# Patient Record
Sex: MALE | Race: WHITE | NOT HISPANIC OR LATINO | Employment: FULL TIME | URBAN - METROPOLITAN AREA
[De-identification: names, ages, dates, MRNs, and addresses within clinical notes are randomized per-mention and may not be internally consistent; named-entity substitution may affect disease eponyms.]

---

## 2017-04-04 ENCOUNTER — HISTORICAL (OUTPATIENT)
Dept: LAB | Facility: HOSPITAL | Age: 62
End: 2017-04-04

## 2017-05-11 ENCOUNTER — HISTORICAL (OUTPATIENT)
Dept: LAB | Facility: HOSPITAL | Age: 62
End: 2017-05-11

## 2017-10-03 ENCOUNTER — HISTORICAL (OUTPATIENT)
Dept: LAB | Facility: HOSPITAL | Age: 62
End: 2017-10-03

## 2017-10-03 LAB
ALBUMIN SERPL-MCNC: 4.1 GM/DL (ref 3.4–5)
ALBUMIN/GLOB SERPL: 1.1 RATIO (ref 1.1–2)
ALP SERPL-CCNC: 78 UNIT/L (ref 46–116)
ALT SERPL-CCNC: 33 UNIT/L (ref 12–78)
AST SERPL-CCNC: 25 UNIT/L (ref 15–37)
BILIRUB SERPL-MCNC: 0.6 MG/DL (ref 0.2–1)
BILIRUBIN DIRECT+TOT PNL SERPL-MCNC: 0.17 MG/DL (ref 0–0.2)
BILIRUBIN DIRECT+TOT PNL SERPL-MCNC: 0.43 MG/DL (ref 0–0.8)
BUN SERPL-MCNC: 19.4 MG/DL (ref 7–18)
CALCIUM SERPL-MCNC: 10.2 MG/DL (ref 8.5–10.1)
CHLORIDE SERPL-SCNC: 104 MMOL/L (ref 98–107)
CO2 SERPL-SCNC: 29.3 MMOL/L (ref 21–32)
CREAT SERPL-MCNC: 1.5 MG/DL (ref 0.6–1.3)
EST. AVERAGE GLUCOSE BLD GHB EST-MCNC: 123 MG/DL
GLOBULIN SER-MCNC: 3.7 GM/DL (ref 2.4–3.5)
GLUCOSE SERPL-MCNC: 134 MG/DL (ref 74–106)
HBA1C MFR BLD: 5.9 % (ref 4.5–6.2)
POTASSIUM SERPL-SCNC: 5.5 MMOL/L (ref 3.5–5.1)
PROT SERPL-MCNC: 7.8 GM/DL (ref 6.4–8.2)
SODIUM SERPL-SCNC: 139 MMOL/L (ref 136–145)

## 2018-04-03 ENCOUNTER — HISTORICAL (OUTPATIENT)
Dept: LAB | Facility: HOSPITAL | Age: 63
End: 2018-04-03

## 2018-10-31 ENCOUNTER — HISTORICAL (OUTPATIENT)
Dept: LAB | Facility: HOSPITAL | Age: 63
End: 2018-10-31

## 2018-12-11 ENCOUNTER — HISTORICAL (OUTPATIENT)
Dept: LAB | Facility: HOSPITAL | Age: 63
End: 2018-12-11

## 2019-04-30 ENCOUNTER — HISTORICAL (OUTPATIENT)
Dept: LAB | Facility: HOSPITAL | Age: 64
End: 2019-04-30

## 2019-06-10 LAB — NONINV COLON CA DNA+OCC BLD SCRN STL QL: NEGATIVE

## 2019-08-15 ENCOUNTER — HISTORICAL (OUTPATIENT)
Dept: LAB | Facility: HOSPITAL | Age: 64
End: 2019-08-15

## 2021-09-16 ENCOUNTER — HISTORICAL (OUTPATIENT)
Dept: INFUSION THERAPY | Facility: HOSPITAL | Age: 66
End: 2021-09-16

## 2021-09-16 LAB
ABS NEUT (OLG): 3.29 X10(3)/MCL (ref 2.1–9.2)
ALBUMIN SERPL-MCNC: 4.4 GM/DL (ref 3.4–4.8)
ALBUMIN/GLOB SERPL: 1.4 RATIO (ref 1.1–2)
ALP SERPL-CCNC: 64 UNIT/L (ref 40–150)
ALT SERPL-CCNC: 18 UNIT/L (ref 0–55)
AST SERPL-CCNC: 25 UNIT/L (ref 5–34)
BASOPHILS # BLD AUTO: 0 X10(3)/MCL (ref 0–0.2)
BASOPHILS NFR BLD AUTO: 1 %
BILIRUB SERPL-MCNC: 1.2 MG/DL
BILIRUBIN DIRECT+TOT PNL SERPL-MCNC: 0.4 MG/DL (ref 0–0.5)
BILIRUBIN DIRECT+TOT PNL SERPL-MCNC: 0.8 MG/DL (ref 0–0.8)
BUN SERPL-MCNC: 24.1 MG/DL (ref 8.4–25.7)
CALCIUM SERPL-MCNC: 10 MG/DL (ref 8.8–10)
CHLORIDE SERPL-SCNC: 103 MMOL/L (ref 98–107)
CHOLEST SERPL-MCNC: 196 MG/DL
CHOLEST/HDLC SERPL: 4 {RATIO} (ref 0–5)
CO2 SERPL-SCNC: 22 MMOL/L (ref 23–31)
CREAT SERPL-MCNC: 1.06 MG/DL (ref 0.73–1.18)
DEPRECATED CALCIDIOL+CALCIFEROL SERPL-MC: 31.3 NG/ML (ref 30–80)
EOSINOPHIL # BLD AUTO: 0.3 X10(3)/MCL (ref 0–0.9)
EOSINOPHIL NFR BLD AUTO: 5 %
ERYTHROCYTE [DISTWIDTH] IN BLOOD BY AUTOMATED COUNT: 12.9 % (ref 11.5–17)
EST. AVERAGE GLUCOSE BLD GHB EST-MCNC: 116.9 MG/DL
GLOBULIN SER-MCNC: 3.1 GM/DL (ref 2.4–3.5)
GLUCOSE SERPL-MCNC: 100 MG/DL (ref 82–115)
HBA1C MFR BLD: 5.7 %
HCT VFR BLD AUTO: 42.6 % (ref 42–52)
HDLC SERPL-MCNC: 54 MG/DL (ref 35–60)
HGB BLD-MCNC: 14.4 GM/DL (ref 14–18)
IMM GRANULOCYTES # BLD AUTO: 0.01 % (ref 0–0.02)
IMM GRANULOCYTES NFR BLD AUTO: 0.2 % (ref 0–0.43)
LDLC SERPL CALC-MCNC: 128 MG/DL (ref 50–140)
LYMPHOCYTES # BLD AUTO: 0.9 X10(3)/MCL (ref 0.6–4.6)
LYMPHOCYTES NFR BLD AUTO: 18 %
MCH RBC QN AUTO: 29.6 PG (ref 27–31)
MCHC RBC AUTO-ENTMCNC: 33.8 GM/DL (ref 33–36)
MCV RBC AUTO: 87.7 FL (ref 80–94)
MONOCYTES # BLD AUTO: 0.4 X10(3)/MCL (ref 0.1–1.3)
MONOCYTES NFR BLD AUTO: 9 %
NEUTROPHILS # BLD AUTO: 3.29 X10(3)/MCL (ref 1.4–7.9)
NEUTROPHILS NFR BLD AUTO: 67 %
PLATELET # BLD AUTO: 202 X10(3)/MCL (ref 130–400)
PMV BLD AUTO: 9.3 FL (ref 9.4–12.4)
POTASSIUM SERPL-SCNC: 3.8 MMOL/L (ref 3.5–5.1)
PROT SERPL-MCNC: 7.5 GM/DL (ref 5.8–7.6)
PSA SERPL-MCNC: 1.31 NG/ML
RBC # BLD AUTO: 4.86 X10(6)/MCL (ref 4.7–6.1)
SODIUM SERPL-SCNC: 136 MMOL/L (ref 136–145)
TRIGL SERPL-MCNC: 68 MG/DL (ref 34–140)
TSH SERPL-ACNC: 3.51 UIU/ML (ref 0.35–4.94)
VLDLC SERPL CALC-MCNC: 14 MG/DL
WBC # SPEC AUTO: 4.9 X10(3)/MCL (ref 4.5–11.5)

## 2022-04-09 ENCOUNTER — HISTORICAL (OUTPATIENT)
Dept: ADMINISTRATIVE | Facility: HOSPITAL | Age: 67
End: 2022-04-09
Payer: COMMERCIAL

## 2022-04-25 VITALS
DIASTOLIC BLOOD PRESSURE: 81 MMHG | WEIGHT: 188.25 LBS | OXYGEN SATURATION: 99 % | BODY MASS INDEX: 26.35 KG/M2 | SYSTOLIC BLOOD PRESSURE: 134 MMHG | HEIGHT: 71 IN

## 2022-09-28 PROBLEM — N20.0 CALCULUS OF KIDNEY: Status: ACTIVE | Noted: 2022-09-28

## 2022-09-28 PROBLEM — R73.02 IMPAIRED GLUCOSE TOLERANCE: Status: ACTIVE | Noted: 2022-09-28

## 2022-09-29 PROBLEM — Z87.442 HISTORY OF NEPHROLITHIASIS: Status: ACTIVE | Noted: 2022-09-29

## 2022-09-29 PROBLEM — Z97.4 PRESENCE OF EXTERNAL HEARING AID: Status: ACTIVE | Noted: 2022-09-29

## 2022-09-29 PROBLEM — R73.01 IMPAIRED FASTING BLOOD SUGAR: Status: ACTIVE | Noted: 2022-09-29

## 2022-09-29 PROBLEM — F51.01 PRIMARY INSOMNIA: Status: ACTIVE | Noted: 2022-09-29

## 2022-09-30 PROCEDURE — 86803 HEPATITIS C AB TEST: CPT | Performed by: FAMILY MEDICINE

## 2022-10-12 ENCOUNTER — DOCUMENTATION ONLY (OUTPATIENT)
Dept: ADMINISTRATIVE | Facility: HOSPITAL | Age: 67
End: 2022-10-12
Payer: COMMERCIAL

## 2022-10-20 DIAGNOSIS — N20.0 URIC ACID NEPHROLITHIASIS: ICD-10-CM

## 2022-10-20 DIAGNOSIS — D41.02 NEOPLASM OF UNCERTAIN BEHAVIOR OF LEFT KIDNEY: Primary | ICD-10-CM

## 2022-10-20 DIAGNOSIS — R31.0 GROSS HEMATURIA: ICD-10-CM

## 2022-10-25 ENCOUNTER — HOSPITAL ENCOUNTER (OUTPATIENT)
Dept: RADIOLOGY | Facility: HOSPITAL | Age: 67
Discharge: HOME | End: 2022-10-25
Attending: UROLOGY
Payer: COMMERCIAL

## 2022-10-25 DIAGNOSIS — D41.02 NEOPLASM OF UNCERTAIN BEHAVIOR OF LEFT KIDNEY: ICD-10-CM

## 2022-10-25 PROCEDURE — 25500020 PHARM REV CODE 255: Performed by: UROLOGY

## 2022-10-25 PROCEDURE — 74178 CT ABD&PLV WO CNTR FLWD CNTR: CPT | Mod: TC

## 2022-10-25 RX ADMIN — IOPAMIDOL 100 ML: 755 INJECTION, SOLUTION INTRAVENOUS at 08:10

## 2022-12-08 PROBLEM — C64.2 CLEAR CELL RENAL CELL CARCINOMA, LEFT: Status: ACTIVE | Noted: 2022-12-08

## 2022-12-08 PROBLEM — E11.9 TYPE 2 DIABETES MELLITUS WITHOUT COMPLICATION, WITHOUT LONG-TERM CURRENT USE OF INSULIN: Status: ACTIVE | Noted: 2022-12-08

## 2022-12-08 PROBLEM — H04.301 DACROCYSTITIS, RIGHT: Status: ACTIVE | Noted: 2022-12-08

## 2022-12-08 PROCEDURE — 85730 THROMBOPLASTIN TIME PARTIAL: CPT | Performed by: NURSE PRACTITIONER

## 2022-12-08 PROCEDURE — 85610 PROTHROMBIN TIME: CPT | Performed by: NURSE PRACTITIONER

## 2022-12-15 ENCOUNTER — ANESTHESIA (OUTPATIENT)
Dept: SURGERY | Facility: HOSPITAL | Age: 67
End: 2022-12-15
Payer: COMMERCIAL

## 2022-12-15 ENCOUNTER — ANESTHESIA EVENT (OUTPATIENT)
Dept: SURGERY | Facility: HOSPITAL | Age: 67
End: 2022-12-15
Payer: COMMERCIAL

## 2022-12-15 ENCOUNTER — HOSPITAL ENCOUNTER (OUTPATIENT)
Facility: HOSPITAL | Age: 67
Discharge: HOME | End: 2022-12-15
Attending: OTOLARYNGOLOGY | Admitting: OTOLARYNGOLOGY
Payer: COMMERCIAL

## 2022-12-15 DIAGNOSIS — H04.329: ICD-10-CM

## 2022-12-15 LAB
POCT GLUCOSE: 134 MG/DL (ref 70–110)
POCT GLUCOSE: 150 MG/DL (ref 70–110)

## 2022-12-15 PROCEDURE — 82962 GLUCOSE BLOOD TEST: CPT | Performed by: OTOLARYNGOLOGY

## 2022-12-15 PROCEDURE — 36000709 HC OR TIME LEV III EA ADD 15 MIN: Performed by: OTOLARYNGOLOGY

## 2022-12-15 PROCEDURE — 25000003 PHARM REV CODE 250

## 2022-12-15 PROCEDURE — 71000015 HC POSTOP RECOV 1ST HR: Performed by: OTOLARYNGOLOGY

## 2022-12-15 PROCEDURE — C9046 COCAINE HCL NASAL SOLUTION: HCPCS | Mod: TB | Performed by: OTOLARYNGOLOGY

## 2022-12-15 PROCEDURE — 71000016 HC POSTOP RECOV ADDL HR: Performed by: OTOLARYNGOLOGY

## 2022-12-15 PROCEDURE — 63600175 PHARM REV CODE 636 W HCPCS: Performed by: OTOLARYNGOLOGY

## 2022-12-15 PROCEDURE — 63600175 PHARM REV CODE 636 W HCPCS

## 2022-12-15 PROCEDURE — 37000009 HC ANESTHESIA EA ADD 15 MINS: Performed by: OTOLARYNGOLOGY

## 2022-12-15 PROCEDURE — 36000708 HC OR TIME LEV III 1ST 15 MIN: Performed by: OTOLARYNGOLOGY

## 2022-12-15 PROCEDURE — 27201423 OPTIME MED/SURG SUP & DEVICES STERILE SUPPLY: Performed by: OTOLARYNGOLOGY

## 2022-12-15 PROCEDURE — 71000033 HC RECOVERY, INTIAL HOUR: Performed by: OTOLARYNGOLOGY

## 2022-12-15 PROCEDURE — P9047 ALBUMIN (HUMAN), 25%, 50ML: HCPCS | Mod: JG

## 2022-12-15 PROCEDURE — 25000003 PHARM REV CODE 250: Performed by: OTOLARYNGOLOGY

## 2022-12-15 PROCEDURE — 63600175 PHARM REV CODE 636 W HCPCS: Mod: JG

## 2022-12-15 PROCEDURE — 63600175 PHARM REV CODE 636 W HCPCS: Performed by: ANESTHESIOLOGY

## 2022-12-15 PROCEDURE — 37000008 HC ANESTHESIA 1ST 15 MINUTES: Performed by: OTOLARYNGOLOGY

## 2022-12-15 PROCEDURE — 25000003 PHARM REV CODE 250: Performed by: ANESTHESIOLOGY

## 2022-12-15 RX ORDER — SILVER NITRATE 38.21; 12.74 MG/1; MG/1
STICK TOPICAL
Status: DISCONTINUED | OUTPATIENT
Start: 2022-12-15 | End: 2022-12-15 | Stop reason: HOSPADM

## 2022-12-15 RX ORDER — ALBUMIN HUMAN 250 G/1000ML
SOLUTION INTRAVENOUS CONTINUOUS PRN
Status: DISCONTINUED | OUTPATIENT
Start: 2022-12-15 | End: 2022-12-15

## 2022-12-15 RX ORDER — MIDAZOLAM HYDROCHLORIDE 1 MG/ML
2 INJECTION INTRAMUSCULAR; INTRAVENOUS ONCE AS NEEDED
Status: COMPLETED | OUTPATIENT
Start: 2022-12-15 | End: 2022-12-15

## 2022-12-15 RX ORDER — COCAINE HYDROCHLORIDE 40 MG/ML
SOLUTION NASAL
Status: DISCONTINUED
Start: 2022-12-15 | End: 2022-12-15 | Stop reason: HOSPADM

## 2022-12-15 RX ORDER — GLYCOPYRROLATE 0.2 MG/ML
INJECTION INTRAMUSCULAR; INTRAVENOUS
Status: DISCONTINUED | OUTPATIENT
Start: 2022-12-15 | End: 2022-12-15

## 2022-12-15 RX ORDER — ONDANSETRON HYDROCHLORIDE 2 MG/ML
INJECTION, SOLUTION INTRAMUSCULAR; INTRAVENOUS
Status: DISCONTINUED | OUTPATIENT
Start: 2022-12-15 | End: 2022-12-15

## 2022-12-15 RX ORDER — ONDANSETRON 2 MG/ML
4 INJECTION INTRAMUSCULAR; INTRAVENOUS DAILY PRN
Status: DISCONTINUED | OUTPATIENT
Start: 2022-12-15 | End: 2022-12-15 | Stop reason: HOSPADM

## 2022-12-15 RX ORDER — METOCLOPRAMIDE HYDROCHLORIDE 5 MG/ML
10 INJECTION INTRAMUSCULAR; INTRAVENOUS EVERY 10 MIN PRN
Status: DISCONTINUED | OUTPATIENT
Start: 2022-12-15 | End: 2022-12-15 | Stop reason: HOSPADM

## 2022-12-15 RX ORDER — PROPOFOL 10 MG/ML
VIAL (ML) INTRAVENOUS
Status: DISCONTINUED | OUTPATIENT
Start: 2022-12-15 | End: 2022-12-15

## 2022-12-15 RX ORDER — ACETAMINOPHEN 500 MG
1000 TABLET ORAL
Status: COMPLETED | OUTPATIENT
Start: 2022-12-15 | End: 2022-12-15

## 2022-12-15 RX ORDER — ONDANSETRON 4 MG/1
4 TABLET, ORALLY DISINTEGRATING ORAL ONCE
Status: COMPLETED | OUTPATIENT
Start: 2022-12-15 | End: 2022-12-15

## 2022-12-15 RX ORDER — CEFAZOLIN SODIUM 1 G/3ML
2 INJECTION, POWDER, FOR SOLUTION INTRAMUSCULAR; INTRAVENOUS
Status: COMPLETED | OUTPATIENT
Start: 2022-12-15 | End: 2022-12-15

## 2022-12-15 RX ORDER — ONDANSETRON 2 MG/ML
4 INJECTION INTRAMUSCULAR; INTRAVENOUS EVERY 4 HOURS PRN
Status: DISCONTINUED | OUTPATIENT
Start: 2022-12-15 | End: 2022-12-15 | Stop reason: HOSPADM

## 2022-12-15 RX ORDER — FENTANYL CITRATE 50 UG/ML
INJECTION, SOLUTION INTRAMUSCULAR; INTRAVENOUS
Status: DISCONTINUED | OUTPATIENT
Start: 2022-12-15 | End: 2022-12-15

## 2022-12-15 RX ORDER — COCAINE HYDROCHLORIDE 40 MG/ML
SOLUTION NASAL
Status: DISCONTINUED | OUTPATIENT
Start: 2022-12-15 | End: 2022-12-15 | Stop reason: HOSPADM

## 2022-12-15 RX ORDER — SILVER NITRATE 38.21; 12.74 MG/1; MG/1
STICK TOPICAL
Status: DISCONTINUED
Start: 2022-12-15 | End: 2022-12-15 | Stop reason: HOSPADM

## 2022-12-15 RX ORDER — ACETAMINOPHEN 500 MG
TABLET ORAL
Status: DISCONTINUED
Start: 2022-12-15 | End: 2022-12-15 | Stop reason: HOSPADM

## 2022-12-15 RX ORDER — LIDOCAINE HYDROCHLORIDE 10 MG/ML
1 INJECTION, SOLUTION EPIDURAL; INFILTRATION; INTRACAUDAL; PERINEURAL ONCE
Status: CANCELLED | OUTPATIENT
Start: 2022-12-15 | End: 2022-12-15

## 2022-12-15 RX ORDER — SODIUM CHLORIDE, SODIUM LACTATE, POTASSIUM CHLORIDE, CALCIUM CHLORIDE 600; 310; 30; 20 MG/100ML; MG/100ML; MG/100ML; MG/100ML
INJECTION, SOLUTION INTRAVENOUS CONTINUOUS
Status: DISCONTINUED | OUTPATIENT
Start: 2022-12-15 | End: 2022-12-15 | Stop reason: HOSPADM

## 2022-12-15 RX ORDER — DEXMEDETOMIDINE HYDROCHLORIDE 100 UG/ML
INJECTION, SOLUTION INTRAVENOUS
Status: DISCONTINUED | OUTPATIENT
Start: 2022-12-15 | End: 2022-12-15

## 2022-12-15 RX ORDER — HYDROMORPHONE HYDROCHLORIDE 2 MG/ML
0.2 INJECTION, SOLUTION INTRAMUSCULAR; INTRAVENOUS; SUBCUTANEOUS EVERY 5 MIN PRN
Status: DISCONTINUED | OUTPATIENT
Start: 2022-12-15 | End: 2022-12-15 | Stop reason: HOSPADM

## 2022-12-15 RX ORDER — OXYMETAZOLINE HCL 0.05 %
SPRAY, NON-AEROSOL (ML) NASAL
Status: DISCONTINUED | OUTPATIENT
Start: 2022-12-15 | End: 2022-12-15 | Stop reason: HOSPADM

## 2022-12-15 RX ORDER — ERYTHROMYCIN 5 MG/G
OINTMENT OPHTHALMIC
Status: DISCONTINUED
Start: 2022-12-15 | End: 2022-12-15 | Stop reason: HOSPADM

## 2022-12-15 RX ORDER — ERYTHROMYCIN 5 MG/G
OINTMENT OPHTHALMIC
Status: DISCONTINUED | OUTPATIENT
Start: 2022-12-15 | End: 2022-12-15 | Stop reason: HOSPADM

## 2022-12-15 RX ORDER — GABAPENTIN 300 MG/1
CAPSULE ORAL
Status: DISCONTINUED
Start: 2022-12-15 | End: 2022-12-15 | Stop reason: HOSPADM

## 2022-12-15 RX ORDER — BUPIVACAINE HYDROCHLORIDE AND EPINEPHRINE 5; 5 MG/ML; UG/ML
INJECTION, SOLUTION EPIDURAL; INTRACAUDAL; PERINEURAL
Status: DISCONTINUED
Start: 2022-12-15 | End: 2022-12-15 | Stop reason: HOSPADM

## 2022-12-15 RX ORDER — LIDOCAINE HYDROCHLORIDE 10 MG/ML
INJECTION, SOLUTION EPIDURAL; INFILTRATION; INTRACAUDAL; PERINEURAL
Status: DISCONTINUED | OUTPATIENT
Start: 2022-12-15 | End: 2022-12-15

## 2022-12-15 RX ORDER — SODIUM CHLORIDE, SODIUM GLUCONATE, SODIUM ACETATE, POTASSIUM CHLORIDE AND MAGNESIUM CHLORIDE 30; 37; 368; 526; 502 MG/100ML; MG/100ML; MG/100ML; MG/100ML; MG/100ML
INJECTION, SOLUTION INTRAVENOUS CONTINUOUS
Status: CANCELLED | OUTPATIENT
Start: 2022-12-15 | End: 2023-01-14

## 2022-12-15 RX ORDER — ALBUMIN HUMAN 250 G/1000ML
SOLUTION INTRAVENOUS
Status: COMPLETED
Start: 2022-12-15 | End: 2022-12-15

## 2022-12-15 RX ORDER — DIPHENHYDRAMINE HYDROCHLORIDE 50 MG/ML
25 INJECTION INTRAMUSCULAR; INTRAVENOUS EVERY 6 HOURS PRN
Status: DISCONTINUED | OUTPATIENT
Start: 2022-12-15 | End: 2022-12-15 | Stop reason: HOSPADM

## 2022-12-15 RX ORDER — LABETALOL HYDROCHLORIDE 5 MG/ML
INJECTION, SOLUTION INTRAVENOUS
Status: DISCONTINUED | OUTPATIENT
Start: 2022-12-15 | End: 2022-12-15

## 2022-12-15 RX ORDER — DEXAMETHASONE SODIUM PHOSPHATE 4 MG/ML
INJECTION, SOLUTION INTRA-ARTICULAR; INTRALESIONAL; INTRAMUSCULAR; INTRAVENOUS; SOFT TISSUE
Status: DISCONTINUED | OUTPATIENT
Start: 2022-12-15 | End: 2022-12-15

## 2022-12-15 RX ORDER — ONDANSETRON 4 MG/1
TABLET, ORALLY DISINTEGRATING ORAL
Status: DISCONTINUED
Start: 2022-12-15 | End: 2022-12-15 | Stop reason: HOSPADM

## 2022-12-15 RX ORDER — ROCURONIUM BROMIDE 10 MG/ML
INJECTION, SOLUTION INTRAVENOUS
Status: DISCONTINUED | OUTPATIENT
Start: 2022-12-15 | End: 2022-12-15

## 2022-12-15 RX ORDER — BUPIVACAINE HYDROCHLORIDE AND EPINEPHRINE 5; 5 MG/ML; UG/ML
INJECTION, SOLUTION EPIDURAL; INTRACAUDAL; PERINEURAL
Status: DISCONTINUED | OUTPATIENT
Start: 2022-12-15 | End: 2022-12-15 | Stop reason: HOSPADM

## 2022-12-15 RX ORDER — OXYMETAZOLINE HCL 0.05 %
SPRAY, NON-AEROSOL (ML) NASAL
Status: DISCONTINUED
Start: 2022-12-15 | End: 2022-12-15 | Stop reason: HOSPADM

## 2022-12-15 RX ORDER — HYDROCODONE BITARTRATE AND ACETAMINOPHEN 7.5; 325 MG/1; MG/1
1 TABLET ORAL EVERY 4 HOURS PRN
Status: DISCONTINUED | OUTPATIENT
Start: 2022-12-15 | End: 2022-12-15 | Stop reason: HOSPADM

## 2022-12-15 RX ORDER — GABAPENTIN 300 MG/1
300 CAPSULE ORAL
Status: COMPLETED | OUTPATIENT
Start: 2022-12-15 | End: 2022-12-15

## 2022-12-15 RX ORDER — MIDAZOLAM HYDROCHLORIDE 1 MG/ML
INJECTION INTRAMUSCULAR; INTRAVENOUS
Status: COMPLETED
Start: 2022-12-15 | End: 2022-12-15

## 2022-12-15 RX ADMIN — ONDANSETRON 4 MG: 4 TABLET, ORALLY DISINTEGRATING ORAL at 12:12

## 2022-12-15 RX ADMIN — HYDROMORPHONE HYDROCHLORIDE 0.2 MG: 2 INJECTION INTRAMUSCULAR; INTRAVENOUS; SUBCUTANEOUS at 04:12

## 2022-12-15 RX ADMIN — DEXMEDETOMIDINE 4 MCG: 200 INJECTION, SOLUTION INTRAVENOUS at 01:12

## 2022-12-15 RX ADMIN — FENTANYL CITRATE 50 MCG: 50 INJECTION, SOLUTION INTRAMUSCULAR; INTRAVENOUS at 04:12

## 2022-12-15 RX ADMIN — DEXAMETHASONE SODIUM PHOSPHATE 12 MG: 4 INJECTION, SOLUTION INTRA-ARTICULAR; INTRALESIONAL; INTRAMUSCULAR; INTRAVENOUS; SOFT TISSUE at 01:12

## 2022-12-15 RX ADMIN — SUGAMMADEX 200 MG: 100 INJECTION, SOLUTION INTRAVENOUS at 02:12

## 2022-12-15 RX ADMIN — FENTANYL CITRATE 100 MCG: 50 INJECTION, SOLUTION INTRAMUSCULAR; INTRAVENOUS at 01:12

## 2022-12-15 RX ADMIN — DEXMEDETOMIDINE 4 MCG: 200 INJECTION, SOLUTION INTRAVENOUS at 02:12

## 2022-12-15 RX ADMIN — FENTANYL CITRATE 25 MCG: 50 INJECTION, SOLUTION INTRAMUSCULAR; INTRAVENOUS at 02:12

## 2022-12-15 RX ADMIN — ALBUMIN HUMAN: 250 SOLUTION INTRAVENOUS at 02:12

## 2022-12-15 RX ADMIN — LABETALOL HYDROCHLORIDE 5 MG: 5 INJECTION, SOLUTION INTRAVENOUS at 03:12

## 2022-12-15 RX ADMIN — DEXMEDETOMIDINE 6 MCG: 200 INJECTION, SOLUTION INTRAVENOUS at 02:12

## 2022-12-15 RX ADMIN — PROPOFOL 200 MG: 10 INJECTION, EMULSION INTRAVENOUS at 01:12

## 2022-12-15 RX ADMIN — SODIUM CHLORIDE, POTASSIUM CHLORIDE, SODIUM LACTATE AND CALCIUM CHLORIDE: 600; 310; 30; 20 INJECTION, SOLUTION INTRAVENOUS at 01:12

## 2022-12-15 RX ADMIN — SODIUM CHLORIDE, POTASSIUM CHLORIDE, SODIUM LACTATE AND CALCIUM CHLORIDE: 600; 310; 30; 20 INJECTION, SOLUTION INTRAVENOUS at 12:12

## 2022-12-15 RX ADMIN — MIDAZOLAM HYDROCHLORIDE 2 MG: 1 INJECTION INTRAMUSCULAR; INTRAVENOUS at 12:12

## 2022-12-15 RX ADMIN — FENTANYL CITRATE 25 MCG: 50 INJECTION, SOLUTION INTRAMUSCULAR; INTRAVENOUS at 03:12

## 2022-12-15 RX ADMIN — PHENYLEPHRINE HYDROCHLORIDE 2 SPRAY: 0.5 SPRAY NASAL at 12:12

## 2022-12-15 RX ADMIN — MIDAZOLAM HYDROCHLORIDE 2 MG: 1 INJECTION, SOLUTION INTRAMUSCULAR; INTRAVENOUS at 12:12

## 2022-12-15 RX ADMIN — PROPOFOL 100 MG: 10 INJECTION, EMULSION INTRAVENOUS at 02:12

## 2022-12-15 RX ADMIN — ONDANSETRON 4 MG: 2 INJECTION INTRAMUSCULAR; INTRAVENOUS at 01:12

## 2022-12-15 RX ADMIN — GLYCOPYRROLATE 0.2 MG: 0.2 INJECTION INTRAMUSCULAR; INTRAVENOUS at 01:12

## 2022-12-15 RX ADMIN — ACETAMINOPHEN 1000 MG: 500 TABLET ORAL at 12:12

## 2022-12-15 RX ADMIN — LIDOCAINE HYDROCHLORIDE 50 MG: 10 INJECTION, SOLUTION EPIDURAL; INFILTRATION; INTRACAUDAL; PERINEURAL at 01:12

## 2022-12-15 RX ADMIN — CEFAZOLIN 2 G: 330 INJECTION, POWDER, FOR SOLUTION INTRAMUSCULAR; INTRAVENOUS at 01:12

## 2022-12-15 RX ADMIN — GABAPENTIN 300 MG: 300 CAPSULE ORAL at 12:12

## 2022-12-15 RX ADMIN — MINERAL OIL AND WHITE PETROLATUM 0.25 G: 150; 830 OINTMENT OPHTHALMIC at 01:12

## 2022-12-15 RX ADMIN — HYDROCODONE BITARTRATE AND ACETAMINOPHEN 1 TABLET: 7.5; 325 TABLET ORAL at 06:12

## 2022-12-15 RX ADMIN — PROPOFOL 50 MG: 10 INJECTION, EMULSION INTRAVENOUS at 02:12

## 2022-12-15 RX ADMIN — ROCURONIUM BROMIDE 40 MG: 50 INJECTION INTRAVENOUS at 01:12

## 2022-12-15 NOTE — CARE UPDATE
Received patient from the OR, he is asleep,oral airway in place, chin lift not necessary at present-nurse remains at bedside with constant assessment and observation,  respirations full-regular-deep-clear,hob up 30 degrees.

## 2022-12-15 NOTE — CARE UPDATE
Patient awakened,peter,rejected oral airway,oriented/reassured him,c/o pain, respirations full-regular-deep-clear. VA/OM are normal.

## 2022-12-15 NOTE — ANESTHESIA PROCEDURE NOTES
Intubation    Date/Time: 12/15/2022 1:22 PM  Performed by: Negar Kerr CRNA  Authorized by: Gualberto Cantu MD     Intubation:     Induction:  Intravenous    Intubated:  Postinduction    Mask Ventilation:  Easy with oral airway    Attempts:  1    Attempted By:  CRNA    Method of Intubation:  Direct    Blade:  Ziegler 2    Laryngeal View Grade: Grade I - full view of cords      Difficult Airway Encountered?: No      Complications:  None    Airway Device:  Oral endotracheal tube    Airway Device Size:  7.0    Style/Cuff Inflation:  Cuffed (inflated to minimal occlusive pressure)    Inflation Amount (mL):  6    Tube secured:  22    Secured at:  The lips    Placement Verified By:  Capnometry    Complicating Factors:  None    Findings Post-Intubation:  BS equal bilateral

## 2022-12-15 NOTE — ANESTHESIA PREPROCEDURE EVALUATION
"                                                                                                             12/15/2022  Santiago Sutton is a 67 y.o., male who presents with occluded Right eye/lacrimal duct nasal obstruction and sinusitis.  Diagnosis:    Chronic sinusitis      Acute dacryocystitis, unspecified laterality       (Acute dacryocystitis, unspecified laterality [H04.329])    He comes to hospitals for the noted procedure under GA/LMA vs GETA.  Procedure:   PROBING, NASOLACRIMAL DUCT, WITH TUBE INSERTION Right Endoscopic DCR with Hameed Stent Placement (Right: Eye)    PMHx:  Other Medical History   DM (diabetes mellitus) Acute dacryocystitis   Kidney stone Renal malignant tumor   Problem List  Current as of 12/15/22 1141  Calculus of kidney History of nephrolithiasis   Presence of external hearing aid Impaired fasting blood sugar   Primary insomnia Dacrocystitis, right   Clear cell renal cell carcinoma, left Type 2 diabetes mellitus without complication, without long-term current use of insulin             PSHx/Surgical History:  CYSTOSCOPY W/ LASER LITHOTRIPSY NEPHRECTOMY           Vital signs:  Pre Vitals  Current as of 12/15/22 1141  BP: 164/84 Pulse: 68   Resp: 16 SpO2: 97   Temp: 36.7 °C (98 °F)   Height: 6' 1" (1.854 m) (12/15/22) Weight: 79.8 kg (176 lb) (12/07/22)   BMI: 23.22 IBW: 79.9 kg (176 lb 1.7 oz)   Last edited 12/15/22 1042 by RS    Lab Data:              EKG:      Pre-op Assessment    I have reviewed the Patient Summary Reports.     I have reviewed the Nursing Notes. I have reviewed the NPO Status.   I have reviewed the Medications.     Review of Systems  Anesthesia Hx:  No problems with previous Anesthesia    Social:  Non-Smoker    Hematology/Oncology:  Hematology Normal   Oncology Normal     EENT/Dental:EENT/Dental Normal   Cardiovascular:  Cardiovascular Normal Exercise tolerance: good   Functional Capacity good / => 4 METS    Pulmonary:  Pulmonary Normal    Renal/:   Chronic Renal " Disease    Hepatic/GI:  Hepatic/GI Normal    Musculoskeletal:  Musculoskeletal Normal    Neurological:  Neurology Normal    Endocrine:   Diabetes    Dermatological:  Skin Normal    Psych:  Psychiatric Normal           Physical Exam  General: Alert, Oriented, Well nourished and Cooperative    Airway:  Mallampati: II   Mouth Opening: Normal  TM Distance: Normal  Tongue: Normal  Neck ROM: Normal ROM    Dental:  Intact    Chest/Lungs:  Clear to auscultation, Normal Respiratory Rate    Heart:  Rate: Normal  Rhythm: Regular Rhythm        Anesthesia Plan  Type of Anesthesia, risks & benefits discussed:    Anesthesia Type: Gen ETT  Intra-op Monitoring Plan: Standard ASA Monitors  Post Op Pain Control Plan: IV/PO Opioids PRN  Induction:  IV and Inhalation  Airway Plan: Direct  Informed Consent: Informed consent signed with the Patient and all parties understand the risks and agree with anesthesia plan.  All questions answered. Patient consented to blood products? Yes  ASA Score: 2  Day of Surgery Review of History & Physical: H&P Update referred to the surgeon/provider.    Ready For Surgery From Anesthesia Perspective.     .

## 2022-12-15 NOTE — TRANSFER OF CARE
Anesthesia Transfer of Care Note    Patient: Santiago Sutton    Procedure(s) Performed: Procedure(s) (LRB):  PROBING, NASOLACRIMAL DUCT, WITH TUBE INSERTION Right Endoscopic DCR with Hameed Stent Placement (Right)  FESS, WITH NASAL SEPTOPLASTY AND TURBINATE REDUCTION (Right)    Patient location: PACU    Anesthesia Type: general    Transport from OR: Transported from OR on room air with adequate spontaneous ventilation    Post pain: adequate analgesia    Post assessment: no apparent anesthetic complications and tolerated procedure well    Post vital signs: stable    Level of consciousness: awake    Nausea/Vomiting: no nausea/vomiting    Complications: none    Transfer of care protocol was followedComments: Pt awake and stable in the PACU.      Last vitals

## 2022-12-16 VITALS
DIASTOLIC BLOOD PRESSURE: 81 MMHG | RESPIRATION RATE: 14 BRPM | OXYGEN SATURATION: 94 % | SYSTOLIC BLOOD PRESSURE: 155 MMHG | TEMPERATURE: 98 F | WEIGHT: 176 LBS | HEART RATE: 52 BPM | HEIGHT: 73 IN | BODY MASS INDEX: 23.33 KG/M2

## 2022-12-19 NOTE — ANESTHESIA POSTPROCEDURE EVALUATION
Anesthesia Post Evaluation    Patient: Santiago Sutton    Procedure(s) Performed: Procedure(s) (LRB):  PROBING, NASOLACRIMAL DUCT, WITH TUBE INSERTION Right Endoscopic DCR with Hameed Stent Placement (Right)  FESS, WITH NASAL SEPTOPLASTY AND TURBINATE REDUCTION (Right)    Final Anesthesia Type: general      Patient location during evaluation: PACU  Patient participation: Yes- Able to Participate  Level of consciousness: awake and alert and oriented  Post-procedure vital signs: reviewed and stable  Pain management: adequate  Airway patency: patent  MIAH mitigation strategies: Verification of full reversal of neuromuscular block  PONV status at discharge: No PONV  Anesthetic complications: no      Cardiovascular status: blood pressure returned to baseline and stable  Respiratory status: spontaneous ventilation and unassisted  Hydration status: euvolemic  Follow-up not needed.  Comments: [facility]          Vitals Value Taken Time   /81 12/15/22 1818   Temp 36.7 °C (98.1 °F) 12/15/22 1710   Pulse 52 12/15/22 1818   Resp 14 12/15/22 1801   SpO2 94 % 12/15/22 1818         Event Time   Out of Recovery 17:14:00         Pain/Ramya Score: No data recorded

## 2022-12-21 LAB
ESTROGEN SERPL-MCNC: NORMAL PG/ML
INSULIN SERPL-ACNC: NORMAL U[IU]/ML
LAB AP CLINICAL INFORMATION: NORMAL
LAB AP GROSS DESCRIPTION: NORMAL
LAB AP REPORT FOOTNOTES: NORMAL
T3RU NFR SERPL: NORMAL %

## 2023-01-13 NOTE — OP NOTE
PATIENT NAME:      SANTIAGO NEVAREZ  YOB: 1955  CSN:               [id number]  MRN:               [id number]  ADMIT DATE:        12/15/2022 09:37:00  PHYSICIAN:         Kingston Rojas                          OPERATIVE REPORT      DATE OF SURGERY:    12/15/2022 00:00:00    SURGEON:  Kingston Rojas    PREOPERATIVE DIAGNOSES:    1. Chronic dacryocystorhinitis.  2. Chronic recurrent sinusitis.    3. Nasal septal deviation.    POSTOPERATIVE DIAGNOSES:    1. Chronic dacryocystorhinitis.  2. Chronic recurrent sinusitis.    3. Nasal septal deviation.    OPERATIONS PERFORMED:  Endoscopic dacryocystorhinostomy with placement of   right-sided Hameed stent, revision septoplasty, turbinoplasty, right   ethmoidectomy, sphenoidotomy, frontal sinus and maxillary antrostomy.    INDICATIONS FOR SURGERY:  Santiago presented to me with an intermittent swelling of   the medial canthal area and some significant epiphora on the right side.  He had   a previous septoplasty in the past, and in the office it was noticed that he   had a posterior septal perforation and a significant obstructive nasal septal   deviation.    DESCRIPTION OF PROCEDURE:  With proper consent information, patient brought to   the operating room, placed on operating room table in supine position.  With   satisfactory endotracheal intubation and general anesthesia, patient was placed   asleep.  The sinuses were directed first.  The revision septoplasty was done   through a left hemitransfixion incision that was made.  This was not violating   the nasal septal perforation, and the nasal septal perforation was not   addressed.  The deviation was removed, leaving a caudal and dorsal strut of   approximately 1 cm.  Submucosal resection of inferior turbinate was then done in   the usual fashion.  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  This was opened widely all the   way back  to the fovea ethmoidalis, and there was some chronically infected,   inspissated material in this area.  The ethmoid area was opened widely.  It was   noticed that his lamina papyracea, which I feel contributed to his medial   canthal cellulitis and purulence and possibly his dacryocystorhinitis, was   totally dehiscent.  The sphenoid sinus at that point was opened, and there was   some chronically infected, inspissated material as well as in the frontal sinus   and the maxillary sinus, which were all opened with a backbiting forceps.  I   elected at that point to put a Maynor stent in this area.  Surgery went very   well.  He had full support of his orbital fatty content.  At that point, the DCR   was done first.  The lacrimal probes were used to open up the puncta superiorly   and inferiorly, and the puncta were dilated sequentially.  The Hameed tubes   were placed in the nasolacrimal system.  Nasolacrimal system was completely   destroyed by his ethmoiditis; however, I was able to track down the inferior   turbinate and suture it and used a 2-0 silk to hold the 2 areas together.  He   tolerated the procedure very well.  There was no bleeding, and he was   transferred back to recovery room awake, alert, and responsive.        ______________________________  Kingston CRUMP/MAVIS  DD:  01/13/2023  Time:  10:39AM  DT:  01/13/2023  Time:  12:37PM  Job #:  [id number]      OPERATIVE REPORT

## 2023-03-06 PROBLEM — R03.0 WHITE COAT SYNDROME WITH HIGH BLOOD PRESSURE BUT WITHOUT HYPERTENSION: Status: ACTIVE | Noted: 2023-03-06

## 2023-10-05 PROBLEM — E78.00 PURE HYPERCHOLESTEROLEMIA: Status: ACTIVE | Noted: 2023-10-05

## 2023-10-18 DIAGNOSIS — R59.9 ENLARGED LYMPH NODES, UNSPECIFIED: Primary | ICD-10-CM

## 2023-10-25 ENCOUNTER — OFFICE VISIT (OUTPATIENT)
Dept: HEMATOLOGY/ONCOLOGY | Facility: CLINIC | Age: 68
End: 2023-10-25
Payer: COMMERCIAL

## 2023-10-25 VITALS
SYSTOLIC BLOOD PRESSURE: 152 MMHG | RESPIRATION RATE: 18 BRPM | HEART RATE: 71 BPM | WEIGHT: 179.38 LBS | BODY MASS INDEX: 23.78 KG/M2 | TEMPERATURE: 98 F | DIASTOLIC BLOOD PRESSURE: 55 MMHG | OXYGEN SATURATION: 98 % | HEIGHT: 73 IN

## 2023-10-25 DIAGNOSIS — R59.9 ENLARGED LYMPH NODES, UNSPECIFIED: Primary | ICD-10-CM

## 2023-10-25 DIAGNOSIS — R91.8 LUNG NODULES: ICD-10-CM

## 2023-10-25 DIAGNOSIS — C64.2 CLEAR CELL RENAL CELL CARCINOMA, LEFT: ICD-10-CM

## 2023-10-25 PROCEDURE — 3044F PR MOST RECENT HEMOGLOBIN A1C LEVEL <7.0%: ICD-10-PCS | Mod: CPTII,S$GLB,, | Performed by: INTERNAL MEDICINE

## 2023-10-25 PROCEDURE — 3008F BODY MASS INDEX DOCD: CPT | Mod: CPTII,S$GLB,, | Performed by: INTERNAL MEDICINE

## 2023-10-25 PROCEDURE — 1126F PR PAIN SEVERITY QUANTIFIED, NO PAIN PRESENT: ICD-10-PCS | Mod: CPTII,S$GLB,, | Performed by: INTERNAL MEDICINE

## 2023-10-25 PROCEDURE — 3066F NEPHROPATHY DOC TX: CPT | Mod: CPTII,S$GLB,, | Performed by: INTERNAL MEDICINE

## 2023-10-25 PROCEDURE — 1160F RVW MEDS BY RX/DR IN RCRD: CPT | Mod: CPTII,S$GLB,, | Performed by: INTERNAL MEDICINE

## 2023-10-25 PROCEDURE — 1159F PR MEDICATION LIST DOCUMENTED IN MEDICAL RECORD: ICD-10-PCS | Mod: CPTII,S$GLB,, | Performed by: INTERNAL MEDICINE

## 2023-10-25 PROCEDURE — 3061F NEG MICROALBUMINURIA REV: CPT | Mod: CPTII,S$GLB,, | Performed by: INTERNAL MEDICINE

## 2023-10-25 PROCEDURE — 99205 PR OFFICE/OUTPT VISIT, NEW, LEVL V, 60-74 MIN: ICD-10-PCS | Mod: S$GLB,,, | Performed by: INTERNAL MEDICINE

## 2023-10-25 PROCEDURE — 1160F PR REVIEW ALL MEDS BY PRESCRIBER/CLIN PHARMACIST DOCUMENTED: ICD-10-PCS | Mod: CPTII,S$GLB,, | Performed by: INTERNAL MEDICINE

## 2023-10-25 PROCEDURE — 99999 PR PBB SHADOW E&M-EST. PATIENT-LVL V: ICD-10-PCS | Mod: PBBFAC,,, | Performed by: INTERNAL MEDICINE

## 2023-10-25 PROCEDURE — 1126F AMNT PAIN NOTED NONE PRSNT: CPT | Mod: CPTII,S$GLB,, | Performed by: INTERNAL MEDICINE

## 2023-10-25 PROCEDURE — 3078F PR MOST RECENT DIASTOLIC BLOOD PRESSURE < 80 MM HG: ICD-10-PCS | Mod: CPTII,S$GLB,, | Performed by: INTERNAL MEDICINE

## 2023-10-25 PROCEDURE — 1159F MED LIST DOCD IN RCRD: CPT | Mod: CPTII,S$GLB,, | Performed by: INTERNAL MEDICINE

## 2023-10-25 PROCEDURE — 3077F PR MOST RECENT SYSTOLIC BLOOD PRESSURE >= 140 MM HG: ICD-10-PCS | Mod: CPTII,S$GLB,, | Performed by: INTERNAL MEDICINE

## 2023-10-25 PROCEDURE — 1101F PR PT FALLS ASSESS DOC 0-1 FALLS W/OUT INJ PAST YR: ICD-10-PCS | Mod: CPTII,S$GLB,, | Performed by: INTERNAL MEDICINE

## 2023-10-25 PROCEDURE — 3061F PR NEG MICROALBUMINURIA RESULT DOCUMENTED/REVIEW: ICD-10-PCS | Mod: CPTII,S$GLB,, | Performed by: INTERNAL MEDICINE

## 2023-10-25 PROCEDURE — 3077F SYST BP >= 140 MM HG: CPT | Mod: CPTII,S$GLB,, | Performed by: INTERNAL MEDICINE

## 2023-10-25 PROCEDURE — 3078F DIAST BP <80 MM HG: CPT | Mod: CPTII,S$GLB,, | Performed by: INTERNAL MEDICINE

## 2023-10-25 PROCEDURE — 99205 OFFICE O/P NEW HI 60 MIN: CPT | Mod: S$GLB,,, | Performed by: INTERNAL MEDICINE

## 2023-10-25 PROCEDURE — 3066F PR DOCUMENTATION OF TREATMENT FOR NEPHROPATHY: ICD-10-PCS | Mod: CPTII,S$GLB,, | Performed by: INTERNAL MEDICINE

## 2023-10-25 PROCEDURE — 3008F PR BODY MASS INDEX (BMI) DOCUMENTED: ICD-10-PCS | Mod: CPTII,S$GLB,, | Performed by: INTERNAL MEDICINE

## 2023-10-25 PROCEDURE — 99999 PR PBB SHADOW E&M-EST. PATIENT-LVL V: CPT | Mod: PBBFAC,,, | Performed by: INTERNAL MEDICINE

## 2023-10-25 PROCEDURE — 3288F PR FALLS RISK ASSESSMENT DOCUMENTED: ICD-10-PCS | Mod: CPTII,S$GLB,, | Performed by: INTERNAL MEDICINE

## 2023-10-25 PROCEDURE — 3044F HG A1C LEVEL LT 7.0%: CPT | Mod: CPTII,S$GLB,, | Performed by: INTERNAL MEDICINE

## 2023-10-25 PROCEDURE — 3288F FALL RISK ASSESSMENT DOCD: CPT | Mod: CPTII,S$GLB,, | Performed by: INTERNAL MEDICINE

## 2023-10-25 PROCEDURE — 1101F PT FALLS ASSESS-DOCD LE1/YR: CPT | Mod: CPTII,S$GLB,, | Performed by: INTERNAL MEDICINE

## 2023-10-25 NOTE — PROGRESS NOTES
HEMATOLOGY/ONCOLOGY OFFICE CLINIC VISIT    Visit Information:    Initial Evaluation: 10/25/2023  Referring Provider:  Dr Anderson Hobson  Other providers: Dr Stephane Martines (PCP)  Code status: Not addressed    Diagnosis:  pT1a G2-3 Left RCC  Lymphadenopathy    Present treatment:  Observation    Treatment/Oncology history:  11/25/2022 Left robot assisted laparoscopic partial nephrectomy       Plan of care:     Imaging:  CT AP 1/20/2014: There is a nonspecific left para-aortic enlarged lymph node measuring 1.4 cm x 1.0 cm. The liver, spleen, pancreas and aorta  are within normal limits on this unenhanced study Prominent obstructing proximal right ureteral stone.  CT AP 10/25/2022:  Small pleural-based nodules in both lower lobes measuring 4 mm or less and appears stable compared to prior exam from 01/20/2014.  Partially exophytic enhancing mass in the posteromedial left kidney upper lobe measuring 2.2 x 3.0 x 2.7 cm.  No invasion of the renal sinus fat.  Spleen normal.  Multiple enlarged mesenteric and retroperitoneal lymph nodes, similar to the prior exam.  Reference mesenteric nodes measuring 1.2 cm, previously 2.6 cm.  No aggressive osseous lesions.  CT AP 06/13/2023 outside facility:  7 mm pleural-based nodule present in the posterior right lower lobe.  Few small nodules present in the left lower lobe measuring up to 6 mm.  An abnormal enlarged paraesophageal lymph node measured 2.8 cm.  No liver lesion.  Spleen is mildly enlarged measuring 13 cm.  Partial nephrectomy defect present at the left renal dorsal cortex.  Multiple small lymph node present at the gastroesophageal junction.  Enlarged lymph node present in the small bowel mesentery with a representative lymph node measuring 2.9 x 1.8 cm.  A 2nd representative lymph node measured 2.7 x 2.3 cm.  Multiple mildly enlarged retroperitoneum lymph nodes.  Representative infrarenal left periaortic lymph node measuring 1 cm.  Impression: Abnormal enlarged periesophageal  lymph nodes.  Mild splenomegaly.  Multiple enlarged lymph nodes in the small bowel mesentery.  Mildly enlarged retroperitoneal lymph node.  Etiology uncertain with differential consideration including both low-grade lymphoproliferative disease and metastatic renal disease.  Small pulmonary nodules in the lung bases, metastasis is not excluded.  CT AP 09/21/2023 outside facility: Small nodules seen in lung bases, unchanged.  Spleen is enlarged measuring 13.9 cm.  No significant hepatic steatosis.  No liver lesion seen.  Partial nephrectomy defect is present at the dorsal cortex of the left renal upper pole.  5 mm nonobstructive stone in the left renal lower pole.  Enlarged lymph node with faint calcifications noted surrounding the distal esophagus.  Multiple abnormal enlarged nodes present in the small bowel mesentery, also unchanged.  Retroperitoneal lymph nodes are increased in number but small in size, stable.  Impression:  Stable partial nephrectomy defect in the left kidney.  Enlarged periesophageal lymph node, splenomegaly, and enlarged lymph node in the small bowel mesentery unchanged.  ? CLL      Pathology:  11/25/2022:  Left kidney, partial nephrectomy:  Clear cell carcinoma, grade 2-3, measuring 2.4 cm.  Tumor permeated is the capsule but does not extend through it into perirenal soft tissues.  Margins free of tumor.  Sarcomatoid features absent.  Rhabdoid features absent.  Tumor necrosis absent.  Lymphovascular invasion negative.  pT1a G2-3    CLINICAL HISTORY:       Patient: Santiago Sutton is a very pleasant 68 y.o. male kindly referred for lymphadenopathy.     Patient's lymphadenopathy has been mentioned on CT's AP at least since January 20, 2014. At that time LN were described as nonspecific left para-aortic lymph nodes.  CT scan done in Oct 2022, June and September 2023 mentioned with mesenteric and retroperitoneal lymphadenopathy and splenomegaly is described in the scans of this year only.  CT abdomen  pelvis 09/21/2023 with unchanged mesenteric lymph nodes, retroperitoneal lymph node increased in number but small in size and stable.  Enlarged periesophageal lymph node again noted.  Splenomegaly has been present in June and September 2023 scans but not in 2014.    Patient with h/o left RCC s/p Left robot assisted laparoscopic partial nephrectomy on 11/25/2022. He has recovered well from surgery.    Patient is here today with his wife.  He feels good and voices no concerns.  No fever, chills, sweats.  No chest pain or short of breath.  No unintentional weight loss.        Chief Complaint: OTHER (NP referred by DR. Anderson Hobson for Enlarged Lymph Nodes.)      Interval History:        Past Medical History:   Diagnosis Date    Acute dacryocystitis     DM (diabetes mellitus)     Kidney stone     Renal malignant tumor       Past Surgical History:   Procedure Laterality Date    CYSTOSCOPY W/ LASER LITHOTRIPSY      Dayton Ribeiro MD    FESS, WITH NASAL SEPTOPLASTY AND TURBINATE REDUCTION Right 12/15/2022    Procedure: FESS, WITH NASAL SEPTOPLASTY AND TURBINATE REDUCTION;  Surgeon: Kingston Rojas MD;  Location: [facility] OR;  Service: ENT;  Laterality: Right;    NEPHRECTOMY Left 11/25/2022    partial    PROBING OF NASOLACRIMAL DUCT WITH INSERTION OF TUBE Right 12/15/2022    Procedure: PROBING, NASOLACRIMAL DUCT, WITH TUBE INSERTION Right Endoscopic DCR with Hameed Stent Placement;  Surgeon: Kingston Rojas MD;  Location: [facility] OR;  Service: ENT;  Laterality: Right;    VASECTOMY  08/01/1987     Family History   Problem Relation Age of Onset    Diabetes Mother     Bladder Cancer Mother     Cancer Mother     Heart disease Mother      Social Connections: Not on file       Review of patient's allergies indicates:   Allergen Reactions    Meperidine Nausea And Vomiting      Current Outpatient Medications on File Prior to Visit   Medication Sig Dispense Refill    blood sugar diagnostic Strp Test glucose once daily 100 each 5     "fluticasone propionate (FLONASE) 50 mcg/actuation nasal spray       metFORMIN (GLUCOPHAGE-XR) 500 MG ER 24hr tablet Take 1 tablet (500 mg total) by mouth daily with breakfast. 90 tablet 1     No current facility-administered medications on file prior to visit.      Review of Systems   Constitutional:  Negative for activity change, appetite change, chills, diaphoresis, fatigue, fever and unexpected weight change.   HENT:  Negative for nasal congestion, mouth sores, nosebleeds, sinus pressure/congestion, sore throat and trouble swallowing.    Eyes: Negative.  Negative for visual disturbance.   Respiratory:  Negative for cough and shortness of breath.    Cardiovascular:  Negative for chest pain and palpitations.   Gastrointestinal:  Negative for abdominal distention, abdominal pain, blood in stool, change in bowel habit, constipation, diarrhea, nausea and vomiting.   Endocrine: Negative.    Genitourinary:  Negative for bladder incontinence, decreased urine volume, difficulty urinating, dysuria, frequency, hematuria and urgency.   Musculoskeletal:  Negative for arthralgias, back pain, gait problem, joint swelling, leg pain and myalgias.   Integumentary:  Negative for rash.   Allergic/Immunologic: Negative.    Neurological:  Negative for dizziness, tremors, syncope, weakness, light-headedness, numbness, headaches and memory loss.   Hematological:  Negative for adenopathy. Does not bruise/bleed easily.   Psychiatric/Behavioral:  Negative for agitation, confusion, hallucinations, sleep disturbance and suicidal ideas. The patient is not nervous/anxious.               Vitals:    10/25/23 1422   BP: (!) 152/55   BP Location: Left arm   Patient Position: Sitting   Pulse: 71   Resp: 18   Temp: 97.9 °F (36.6 °C)   TempSrc: Oral   SpO2: 98%   Weight: 81.4 kg (179 lb 6.4 oz)   Height: 6' 1" (1.854 m)      Wt Readings from Last 6 Encounters:   10/25/23 81.4 kg (179 lb 6.4 oz)   10/05/23 80 kg (176 lb 6.4 oz)   03/06/23 84.4 kg (186 " lb)   12/07/22 79.8 kg (176 lb)   12/08/22 82.1 kg (181 lb)   09/29/22 86.2 kg (190 lb)     Body mass index is 23.67 kg/m².  Body surface area is 2.05 meters squared.  Physical Exam  Vitals and nursing note reviewed.   Constitutional:       General: He is not in acute distress.     Appearance: Normal appearance.   HENT:      Head: Normocephalic and atraumatic.      Mouth/Throat:      Mouth: Mucous membranes are moist.   Eyes:      General: No scleral icterus.     Extraocular Movements: Extraocular movements intact.      Conjunctiva/sclera: Conjunctivae normal.   Neck:      Vascular: No JVD.   Cardiovascular:      Rate and Rhythm: Normal rate and regular rhythm.      Heart sounds: No murmur heard.  Pulmonary:      Effort: Pulmonary effort is normal.      Breath sounds: Normal breath sounds. No wheezing or rhonchi.   Abdominal:      General: Bowel sounds are normal. There is no distension.      Palpations: Abdomen is soft. There is splenomegaly (not palpated on exam). There is no hepatomegaly.      Tenderness: There is no abdominal tenderness.   Musculoskeletal:         General: No swelling or deformity.      Cervical back: Neck supple.   Lymphadenopathy:      Head:      Right side of head: No submental or submandibular adenopathy.      Left side of head: No submental or submandibular adenopathy.      Cervical: No cervical adenopathy.      Upper Body:      Right upper body: No supraclavicular or axillary adenopathy.      Left upper body: Axillary adenopathy (subcm) present. No supraclavicular adenopathy.      Lower Body: No right inguinal adenopathy. No left inguinal adenopathy.   Skin:     General: Skin is warm.      Coloration: Skin is not jaundiced.      Findings: No rash.   Neurological:      General: No focal deficit present.      Mental Status: He is alert and oriented to person, place, and time.      Cranial Nerves: Cranial nerves 2-12 are intact.   Psychiatric:         Attention and Perception: Attention  normal.         Behavior: Behavior is cooperative.       ECOG SCORE    0 - Fully active-able to carry on all pre-disease performance without restriction         Laboratory:  CBC with Differential:  Lab Results   Component Value Date    WBC 5.00 10/05/2023    RBC 5.15 10/05/2023    HGB 14.9 10/05/2023    HCT 44.2 10/05/2023    MCV 85.8 10/05/2023    MCH 28.9 10/05/2023    MCHC 33.7 10/05/2023    RDW 13.0 10/05/2023     10/05/2023    MPV 8.5 10/05/2023        CMP:  Sodium   Date Value Ref Range Status   11/26/2022 137 136 - 145 mmol/L Final     Sodium Level   Date Value Ref Range Status   10/05/2023 141 136 - 145 mmol/L Final     Potassium   Date Value Ref Range Status   11/26/2022 4.1 3.5 - 5.1 mmol/L Final     Potassium Level   Date Value Ref Range Status   10/05/2023 4.5 3.5 - 5.1 mmol/L Final     Chloride   Date Value Ref Range Status   11/26/2022 103 100 - 109 mmol/L Final     Carbon Dioxide   Date Value Ref Range Status   10/05/2023 29 21 - 32 mmol/L Final   11/26/2022 26 22 - 33 mmol/L Final     Blood Urea Nitrogen   Date Value Ref Range Status   10/05/2023 31.0 (H) 7.0 - 18.0 mg/dL Final   11/26/2022 20 5 - 25 mg/dL Final     Creatinine   Date Value Ref Range Status   10/05/2023 1.35 (H) 0.60 - 1.30 mg/dL Final   11/26/2022 1.27 (H) 0.57 - 1.25 mg/dL Final     Calcium   Date Value Ref Range Status   11/26/2022 9.6 8.8 - 10.6 mg/dL Final     Calcium Level Total   Date Value Ref Range Status   10/05/2023 10.9 (H) 8.5 - 10.1 mg/dL Final     Albumin Level   Date Value Ref Range Status   10/05/2023 4.8 3.4 - 5.0 g/dL Final     Bilirubin Total   Date Value Ref Range Status   10/05/2023 0.7 0.2 - 1.0 mg/dL Final     Alkaline Phosphatase   Date Value Ref Range Status   10/05/2023 60 38 - 126 unit/L Final     Aspartate Aminotransferase   Date Value Ref Range Status   10/05/2023 22 15 - 37 unit/L Final     Alanine Aminotransferase   Date Value Ref Range Status   10/05/2023 20 7 - 52 unit/L Final     Anion Gap  "  Date Value Ref Range Status   11/26/2022 8 8 - 16 mmol/L Final     eGFR    Date Value Ref Range Status   11/26/2022 62 mL/min/1.73mSq Final     Comment:     In accordance with NKF-ASN Task Force recommendation, calculation based on the Chronic Kidney Disease Epidemiology Collaboration (CKD-EPI) equation without adjustment for race. eGFR adjusted for gender and age and calculated in ml/min/1.73mSquared. eGFR cannot be calculated if patient is under 18 years of age.     Reference Range:   >= 60 ml/min/1.73mSquared.     Estimated GFR-Non    Date Value Ref Range Status   09/16/2021 >60 mL/min/1.73 m2 Final             Assessment:       1. Enlarged lymph nodes, unspecified    2. Clear cell renal cell carcinoma, left    3. Lung nodules    4.     Splenomegaly    Etiology of LAD is uncertain with differential consideration including both low-grade lymphoproliferative disease  (ie low grade lymphoma) vs metastatic renal disease.  Patient with small pulmonary nodules in the lung bases, will do Ct chest for further evaluation, lung metastasis is not excluded.    Relatively stable LAD and now splenomegaly highly suspicious for low grade lymphoma though.  Explain that low-grade lymphoma can be followed closely and then treat if there is any indication for treatment at the time that include threatened end-organ function, cytopenias, bulky disease, steady or rapid progression or patient with symptoms.  Observation include labs and imaging studies.          Plan:         CT Chest to evaluate LAD and pulmonary nodules to eval for mets  Instructed to drink plenty of fluid after CT to "flush" contrast and decreased risk of kidney damage  No blood today as more recent blood counts were normal.   RTC in 2 weeks to discuss results and more definitive recommendations.  The patient was seen, interviewed and examined. Pertinent lab and radiology studies were reviewed.   The patient was given ample " opportunity to ask questions, and to the best of my abilities, all questions answered to satisfaction; patient demonstrated understanding of what we discussed and agreeable to the plan. Pt instructed to call should develop concerning signs/symptoms or have further questions.     I'd like to thank for referring and allowing me the opportunity to participate in the care of this patient and if any questions, please do not hesitate to call the office at [phone].       Kelsie Jean-Baptiste MD  Hematology/Oncology

## 2023-11-09 ENCOUNTER — OFFICE VISIT (OUTPATIENT)
Dept: HEMATOLOGY/ONCOLOGY | Facility: CLINIC | Age: 68
End: 2023-11-09
Payer: COMMERCIAL

## 2023-11-09 VITALS
SYSTOLIC BLOOD PRESSURE: 155 MMHG | HEIGHT: 73 IN | TEMPERATURE: 98 F | OXYGEN SATURATION: 97 % | HEART RATE: 61 BPM | RESPIRATION RATE: 17 BRPM | WEIGHT: 177.88 LBS | DIASTOLIC BLOOD PRESSURE: 91 MMHG | BODY MASS INDEX: 23.57 KG/M2

## 2023-11-09 DIAGNOSIS — R59.1 LYMPHADENOPATHY: ICD-10-CM

## 2023-11-09 DIAGNOSIS — C64.2 CLEAR CELL RENAL CELL CARCINOMA, LEFT: Primary | ICD-10-CM

## 2023-11-09 DIAGNOSIS — R91.8 LUNG NODULES: ICD-10-CM

## 2023-11-09 PROCEDURE — 99999 PR PBB SHADOW E&M-EST. PATIENT-LVL V: ICD-10-PCS | Mod: PBBFAC,,, | Performed by: INTERNAL MEDICINE

## 2023-11-09 PROCEDURE — 1159F MED LIST DOCD IN RCRD: CPT | Mod: CPTII,S$GLB,, | Performed by: INTERNAL MEDICINE

## 2023-11-09 PROCEDURE — 3077F SYST BP >= 140 MM HG: CPT | Mod: CPTII,S$GLB,, | Performed by: INTERNAL MEDICINE

## 2023-11-09 PROCEDURE — 3008F BODY MASS INDEX DOCD: CPT | Mod: CPTII,S$GLB,, | Performed by: INTERNAL MEDICINE

## 2023-11-09 PROCEDURE — 3080F DIAST BP >= 90 MM HG: CPT | Mod: CPTII,S$GLB,, | Performed by: INTERNAL MEDICINE

## 2023-11-09 PROCEDURE — 99214 PR OFFICE/OUTPT VISIT, EST, LEVL IV, 30-39 MIN: ICD-10-PCS | Mod: S$GLB,,, | Performed by: INTERNAL MEDICINE

## 2023-11-09 PROCEDURE — 99999 PR PBB SHADOW E&M-EST. PATIENT-LVL V: CPT | Mod: PBBFAC,,, | Performed by: INTERNAL MEDICINE

## 2023-11-09 PROCEDURE — 3288F PR FALLS RISK ASSESSMENT DOCUMENTED: ICD-10-PCS | Mod: CPTII,S$GLB,, | Performed by: INTERNAL MEDICINE

## 2023-11-09 PROCEDURE — 1101F PR PT FALLS ASSESS DOC 0-1 FALLS W/OUT INJ PAST YR: ICD-10-PCS | Mod: CPTII,S$GLB,, | Performed by: INTERNAL MEDICINE

## 2023-11-09 PROCEDURE — 1101F PT FALLS ASSESS-DOCD LE1/YR: CPT | Mod: CPTII,S$GLB,, | Performed by: INTERNAL MEDICINE

## 2023-11-09 PROCEDURE — 99214 OFFICE O/P EST MOD 30 MIN: CPT | Mod: S$GLB,,, | Performed by: INTERNAL MEDICINE

## 2023-11-09 PROCEDURE — 3044F HG A1C LEVEL LT 7.0%: CPT | Mod: CPTII,S$GLB,, | Performed by: INTERNAL MEDICINE

## 2023-11-09 PROCEDURE — 3288F FALL RISK ASSESSMENT DOCD: CPT | Mod: CPTII,S$GLB,, | Performed by: INTERNAL MEDICINE

## 2023-11-09 PROCEDURE — 3044F PR MOST RECENT HEMOGLOBIN A1C LEVEL <7.0%: ICD-10-PCS | Mod: CPTII,S$GLB,, | Performed by: INTERNAL MEDICINE

## 2023-11-09 PROCEDURE — 3080F PR MOST RECENT DIASTOLIC BLOOD PRESSURE >= 90 MM HG: ICD-10-PCS | Mod: CPTII,S$GLB,, | Performed by: INTERNAL MEDICINE

## 2023-11-09 PROCEDURE — 3077F PR MOST RECENT SYSTOLIC BLOOD PRESSURE >= 140 MM HG: ICD-10-PCS | Mod: CPTII,S$GLB,, | Performed by: INTERNAL MEDICINE

## 2023-11-09 PROCEDURE — 1126F PR PAIN SEVERITY QUANTIFIED, NO PAIN PRESENT: ICD-10-PCS | Mod: CPTII,S$GLB,, | Performed by: INTERNAL MEDICINE

## 2023-11-09 PROCEDURE — 3061F NEG MICROALBUMINURIA REV: CPT | Mod: CPTII,S$GLB,, | Performed by: INTERNAL MEDICINE

## 2023-11-09 PROCEDURE — 3066F NEPHROPATHY DOC TX: CPT | Mod: CPTII,S$GLB,, | Performed by: INTERNAL MEDICINE

## 2023-11-09 PROCEDURE — 1126F AMNT PAIN NOTED NONE PRSNT: CPT | Mod: CPTII,S$GLB,, | Performed by: INTERNAL MEDICINE

## 2023-11-09 PROCEDURE — 3061F PR NEG MICROALBUMINURIA RESULT DOCUMENTED/REVIEW: ICD-10-PCS | Mod: CPTII,S$GLB,, | Performed by: INTERNAL MEDICINE

## 2023-11-09 PROCEDURE — 1159F PR MEDICATION LIST DOCUMENTED IN MEDICAL RECORD: ICD-10-PCS | Mod: CPTII,S$GLB,, | Performed by: INTERNAL MEDICINE

## 2023-11-09 PROCEDURE — 3066F PR DOCUMENTATION OF TREATMENT FOR NEPHROPATHY: ICD-10-PCS | Mod: CPTII,S$GLB,, | Performed by: INTERNAL MEDICINE

## 2023-11-09 PROCEDURE — 3008F PR BODY MASS INDEX (BMI) DOCUMENTED: ICD-10-PCS | Mod: CPTII,S$GLB,, | Performed by: INTERNAL MEDICINE

## 2023-11-09 NOTE — PROGRESS NOTES
HEMATOLOGY/ONCOLOGY OFFICE CLINIC VISIT    Visit Information:    Initial Evaluation: 10/25/2023  Referring Provider:  Dr Anderson Hobson  Other providers: Dr Stephane Martines (PCP)  Code status: Not addressed    Diagnosis:  pT1a G2-3 Left RCC  Lymphadenopathy    Present treatment:  Observation    Treatment/Oncology history:  11/25/2022 Left robot assisted laparoscopic partial nephrectomy       Plan of care:     Imaging:  CT AP 1/20/2014: There is a nonspecific left para-aortic enlarged lymph node measuring 1.4 cm x 1.0 cm. The liver, spleen, pancreas and aorta  are within normal limits on this unenhanced study Prominent obstructing proximal right ureteral stone.  CT AP 10/25/2022:  Small pleural-based nodules in both lower lobes measuring 4 mm or less and appears stable compared to prior exam from 01/20/2014.  Partially exophytic enhancing mass in the posteromedial left kidney upper lobe measuring 2.2 x 3.0 x 2.7 cm.  No invasion of the renal sinus fat.  Spleen normal.  Multiple enlarged mesenteric and retroperitoneal lymph nodes, similar to the prior exam.  Reference mesenteric nodes measuring 1.2 cm, previously 2.6 cm.  No aggressive osseous lesions.  CT AP 06/13/2023 outside facility:  7 mm pleural-based nodule present in the posterior right lower lobe.  Few small nodules present in the left lower lobe measuring up to 6 mm.  An abnormal enlarged paraesophageal lymph node measured 2.8 cm.  No liver lesion.  Spleen is mildly enlarged measuring 13 cm.  Partial nephrectomy defect present at the left renal dorsal cortex.  Multiple small lymph node present at the gastroesophageal junction.  Enlarged lymph node present in the small bowel mesentery with a representative lymph node measuring 2.9 x 1.8 cm.  A 2nd representative lymph node measured 2.7 x 2.3 cm.  Multiple mildly enlarged retroperitoneum lymph nodes.  Representative infrarenal left periaortic lymph node measuring 1 cm.  Impression: Abnormal enlarged periesophageal  lymph nodes.  Mild splenomegaly.  Multiple enlarged lymph nodes in the small bowel mesentery.  Mildly enlarged retroperitoneal lymph node.  Etiology uncertain with differential consideration including both low-grade lymphoproliferative disease and metastatic renal disease.  Small pulmonary nodules in the lung bases, metastasis is not excluded.  CT AP 2023 outside facility: Small nodules seen in lung bases, unchanged.  Spleen is enlarged measuring 13.9 cm.  No significant hepatic steatosis.  No liver lesion seen.  Partial nephrectomy defect is present at the dorsal cortex of the left renal upper pole.  5 mm nonobstructive stone in the left renal lower pole.  Enlarged lymph node with faint calcifications noted surrounding the distal esophagus.  Multiple abnormal enlarged nodes present in the small bowel mesentery, also unchanged.  Retroperitoneal lymph nodes are increased in number but small in size, stable.  Impression:  Stable partial nephrectomy defect in the left kidney.  Enlarged periesophageal lymph node, splenomegaly, and enlarged lymph node in the small bowel mesentery unchanged.  ? CLL  CT Chest 2023 @ [facility]. Constellation of findings strongly suggestive of pulmonary sarcoidosis with fairly extensive perilymphatic distribution nodules within the perihilar aspects of the right and left lung along with mediastinal, hilar, and subcarinal adenopathy. Pulmonary consultation and follow-up is recommended.  2. No acute airspace consolidation or pulmonary fibrosis. No other lymphadenopathy within the chest seen.    Pathology:  2022:  Left kidney, partial nephrectomy:  Clear cell carcinoma, grade 2-3, measuring 2.4 cm.  Tumor permeated is the capsule but does not extend through it into perirenal soft tissues.  Margins free of tumor.  Sarcomatoid features absent.  Rhabdoid features absent.  Tumor necrosis absent.  Lymphovascular invasion negative.  pT1a G2-3    CLINICAL HISTORY:        Patient: Santiago Sutton is a very pleasant 68 y.o. male kindly referred for lymphadenopathy.     Patient's lymphadenopathy has been mentioned on CT's AP at least since January 20, 2014. At that time LN were described as nonspecific left para-aortic lymph nodes.  CT scan done in Oct 2022, June and September 2023 mentioned with mesenteric and retroperitoneal lymphadenopathy and splenomegaly is described in the scans of this year only.  CT abdomen pelvis 09/21/2023 with unchanged mesenteric lymph nodes, retroperitoneal lymph node increased in number but small in size and stable.  Enlarged periesophageal lymph node again noted.  Splenomegaly has been present in June and September 2023 scans but not in 2014.    Patient with h/o left RCC s/p Left robot assisted laparoscopic partial nephrectomy on 11/25/2022. He has recovered well from surgery.    Patient is here today with his wife.  He feels good and voices no concerns.  No fever, chills, sweats.  No chest pain or short of breath.  No unintentional weight loss.      Chief Complaint: OTHER (No concerns today.)      Interval History:    Patient presents today for follow up to discuss CT scan results, accompanied by his wife. He reports coughing in the morning when he wakes up. Overall, he is doing well.  No fever, chills, sweats.  No chest pain or short of breath.  CT scan and recommendations discussed with them.        Past Medical History:   Diagnosis Date    Acute dacryocystitis     DM (diabetes mellitus)     Kidney stone     Renal malignant tumor       Past Surgical History:   Procedure Laterality Date    CYSTOSCOPY W/ LASER LITHOTRIPSY      Dayton Ribeiro MD    FESS, WITH NASAL SEPTOPLASTY AND TURBINATE REDUCTION Right 12/15/2022    Procedure: FESS, WITH NASAL SEPTOPLASTY AND TURBINATE REDUCTION;  Surgeon: Kingston Rojas MD;  Location: [facility];  Service: ENT;  Laterality: Right;    NEPHRECTOMY Left 11/25/2022    partial    PROBING OF NASOLACRIMAL DUCT WITH INSERTION  OF TUBE Right 12/15/2022    Procedure: PROBING, NASOLACRIMAL DUCT, WITH TUBE INSERTION Right Endoscopic DCR with Hameed Stent Placement;  Surgeon: Kingston Rojas MD;  Location: [facility];  Service: ENT;  Laterality: Right;    VASECTOMY  08/01/1987     Family History   Problem Relation Age of Onset    Diabetes Mother     Bladder Cancer Mother     Cancer Mother     Heart disease Mother      Social Connections: Not on file       Review of patient's allergies indicates:   Allergen Reactions    Meperidine Nausea And Vomiting      Current Outpatient Medications on File Prior to Visit   Medication Sig Dispense Refill    blood sugar diagnostic Strp Test glucose once daily 100 each 5    fluticasone propionate (FLONASE) 50 mcg/actuation nasal spray       metFORMIN (GLUCOPHAGE-XR) 500 MG ER 24hr tablet Take 1 tablet (500 mg total) by mouth daily with breakfast. 90 tablet 1     No current facility-administered medications on file prior to visit.      Review of Systems   Constitutional:  Negative for activity change, appetite change, chills, diaphoresis, fatigue, fever and unexpected weight change.   HENT:  Negative for nasal congestion, mouth sores, nosebleeds, sinus pressure/congestion, sore throat and trouble swallowing.    Eyes: Negative.  Negative for visual disturbance.   Respiratory:  Positive for cough (in AM). Negative for shortness of breath.    Cardiovascular:  Negative for chest pain and palpitations.   Gastrointestinal:  Negative for abdominal distention, abdominal pain, blood in stool, change in bowel habit, constipation, diarrhea, nausea and vomiting.   Endocrine: Negative.    Genitourinary:  Negative for bladder incontinence, decreased urine volume, difficulty urinating, dysuria, frequency, hematuria and urgency.   Musculoskeletal:  Negative for arthralgias, back pain, gait problem, joint swelling, leg pain and myalgias.   Integumentary:  Negative for rash.   Allergic/Immunologic: Negative.    Neurological:   "Negative for dizziness, tremors, syncope, weakness, light-headedness, numbness, headaches and memory loss.   Hematological:  Negative for adenopathy. Does not bruise/bleed easily.   Psychiatric/Behavioral:  Negative for agitation, confusion, hallucinations, sleep disturbance and suicidal ideas. The patient is not nervous/anxious.               Vitals:    11/09/23 1445   BP: (!) 155/91   BP Location: Right arm   Patient Position: Sitting   Pulse: 61   Resp: 17   Temp: 97.8 °F (36.6 °C)   TempSrc: Oral   SpO2: 97%   Weight: 80.7 kg (177 lb 14.4 oz)   Height: 6' 1" (1.854 m)        Wt Readings from Last 6 Encounters:   11/09/23 80.7 kg (177 lb 14.4 oz)   10/25/23 81.4 kg (179 lb 6.4 oz)   10/05/23 80 kg (176 lb 6.4 oz)   03/06/23 84.4 kg (186 lb)   12/07/22 79.8 kg (176 lb)   12/08/22 82.1 kg (181 lb)     Body mass index is 23.47 kg/m².  Body surface area is 2.04 meters squared.  Physical Exam  Constitutional:       Appearance: Normal appearance.   HENT:      Head: Normocephalic and atraumatic.   Eyes:      Extraocular Movements: Extraocular movements intact.   Pulmonary:      Effort: Pulmonary effort is normal.   Musculoskeletal:      Cervical back: Neck supple.   Neurological:      Mental Status: He is alert and oriented to person, place, and time.   Psychiatric:         Mood and Affect: Mood normal.         Behavior: Behavior normal.         Judgment: Judgment normal.       ECOG SCORE    0 - Fully active-able to carry on all pre-disease performance without restriction         Laboratory:  CBC with Differential:  Lab Results   Component Value Date    WBC 5.00 10/05/2023    RBC 5.15 10/05/2023    HGB 14.9 10/05/2023    HCT 44.2 10/05/2023    MCV 85.8 10/05/2023    MCH 28.9 10/05/2023    MCHC 33.7 10/05/2023    RDW 13.0 10/05/2023     10/05/2023    MPV 8.5 10/05/2023      CMP:  Sodium   Date Value Ref Range Status   11/26/2022 137 136 - 145 mmol/L Final     Sodium Level   Date Value Ref Range Status   10/05/2023 " 141 136 - 145 mmol/L Final     Potassium   Date Value Ref Range Status   11/26/2022 4.1 3.5 - 5.1 mmol/L Final     Potassium Level   Date Value Ref Range Status   10/05/2023 4.5 3.5 - 5.1 mmol/L Final     Chloride   Date Value Ref Range Status   11/26/2022 103 100 - 109 mmol/L Final     Carbon Dioxide   Date Value Ref Range Status   10/05/2023 29 21 - 32 mmol/L Final   11/26/2022 26 22 - 33 mmol/L Final     Blood Urea Nitrogen   Date Value Ref Range Status   10/05/2023 31.0 (H) 7.0 - 18.0 mg/dL Final   11/26/2022 20 5 - 25 mg/dL Final     Creatinine   Date Value Ref Range Status   10/05/2023 1.35 (H) 0.60 - 1.30 mg/dL Final   11/26/2022 1.27 (H) 0.57 - 1.25 mg/dL Final     Calcium   Date Value Ref Range Status   11/26/2022 9.6 8.8 - 10.6 mg/dL Final     Calcium Level Total   Date Value Ref Range Status   10/05/2023 10.9 (H) 8.5 - 10.1 mg/dL Final     Albumin Level   Date Value Ref Range Status   10/05/2023 4.8 3.4 - 5.0 g/dL Final     Bilirubin Total   Date Value Ref Range Status   10/05/2023 0.7 0.2 - 1.0 mg/dL Final     Alkaline Phosphatase   Date Value Ref Range Status   10/05/2023 60 38 - 126 unit/L Final     Aspartate Aminotransferase   Date Value Ref Range Status   10/05/2023 22 15 - 37 unit/L Final     Alanine Aminotransferase   Date Value Ref Range Status   10/05/2023 20 7 - 52 unit/L Final     Anion Gap   Date Value Ref Range Status   11/26/2022 8 8 - 16 mmol/L Final     eGFR    Date Value Ref Range Status   11/26/2022 62 mL/min/1.73mSq Final     Comment:     In accordance with NKF-ASN Task Force recommendation, calculation based on the Chronic Kidney Disease Epidemiology Collaboration (CKD-EPI) equation without adjustment for race. eGFR adjusted for gender and age and calculated in ml/min/1.73mSquared. eGFR cannot be calculated if patient is under 18 years of age.     Reference Range:   >= 60 ml/min/1.73mSquared.     Estimated GFR-Non    Date Value Ref Range Status  "  09/16/2021 >60 mL/min/1.73 m2 Final         Assessment:       1. Clear cell renal cell carcinoma, left    2. Lung nodules    3. Lymphadenopathy      Splenomegaly  During l;ast visit we discussed:  Etiology of LAD is uncertain with differential consideration including both low-grade lymphoproliferative disease  (ie low grade lymphoma) vs metastatic renal disease.  Patient with small pulmonary nodules in the lung bases, will do Ct chest for further evaluation, lung metastasis is not excluded.    Relatively stable LAD and now splenomegaly highly suspicious for low grade lymphoma though.  Explain that low-grade lymphoma can be followed closely and then treat if there is any indication for treatment at the time that include threatened end-organ function, cytopenias, bulky disease, steady or rapid progression or patient with symptoms.  Observation include labs and imaging studies.    Recent CT Chest on 11/7/23 with findings strongly suggestive of pulmonary sarcoidosis      Plan:       Recent CT Chest on 11/7/23 with findings strongly suggestive of pulmonary sarcoidosis, pulmonology evaluation recommended.  Will repeat CT A/P in 4 months from previous - ordered to be done prior to next visit. May change depending on what Dr. Cha orders  Instructed to drink plenty of fluid after CT to "flush" contrast and decreased risk of kidney damage  Will refer to Dr. Cha   RTC in 11 weeks with MD, same day labs: CBC, CMP    The patient was seen, interviewed and examined. Pertinent lab and radiology studies were reviewed.   The patient was given ample opportunity to ask questions, and to the best of my abilities, all questions answered to satisfaction; patient demonstrated understanding of what we discussed and agreeable to the plan. Pt instructed to call should develop concerning signs/symptoms or have further questions.     Kelsie Jean-Baptiste MD  Hematology/Oncology        Professional Services   I, Candice Del Toro LPN, " acted solely as a scribe for and in the presence of Dr. Kelsie Jean-Baptiste, who performed these services.

## 2023-11-20 ENCOUNTER — LAB VISIT (OUTPATIENT)
Dept: LAB | Facility: HOSPITAL | Age: 68
End: 2023-11-20
Attending: INTERNAL MEDICINE
Payer: COMMERCIAL

## 2023-11-20 DIAGNOSIS — R59.9 SWELLING OF LYMPH NODES: Primary | ICD-10-CM

## 2023-11-20 LAB
ALBUMIN SERPL-MCNC: 4.1 G/DL (ref 3.4–4.8)
ALBUMIN/GLOB SERPL: 1.3 RATIO (ref 1.1–2)
ALP SERPL-CCNC: 75 UNIT/L (ref 40–150)
ALT SERPL-CCNC: 22 UNIT/L (ref 0–55)
APTT PPP: 30.1 SECONDS (ref 23.2–33.7)
AST SERPL-CCNC: 25 UNIT/L (ref 5–34)
BILIRUB SERPL-MCNC: 0.6 MG/DL
BUN SERPL-MCNC: 26.6 MG/DL (ref 8.4–25.7)
CALCIUM SERPL-MCNC: 10.3 MG/DL (ref 8.8–10)
CHLORIDE SERPL-SCNC: 107 MMOL/L (ref 98–107)
CO2 SERPL-SCNC: 27 MMOL/L (ref 23–31)
CREAT SERPL-MCNC: 1.51 MG/DL (ref 0.73–1.18)
ERYTHROCYTE [DISTWIDTH] IN BLOOD BY AUTOMATED COUNT: 12.9 % (ref 11.5–17)
GFR SERPLBLD CREATININE-BSD FMLA CKD-EPI: 50 MLS/MIN/1.73/M2
GLOBULIN SER-MCNC: 3.2 GM/DL (ref 2.4–3.5)
GLUCOSE SERPL-MCNC: 146 MG/DL (ref 82–115)
HCT VFR BLD AUTO: 43.6 % (ref 42–52)
HGB BLD-MCNC: 14.7 G/DL (ref 14–18)
INR PPP: 1
MCH RBC QN AUTO: 29.3 PG (ref 27–31)
MCHC RBC AUTO-ENTMCNC: 33.7 G/DL (ref 33–36)
MCV RBC AUTO: 87 FL (ref 80–94)
NRBC BLD AUTO-RTO: 0 %
PLATELET # BLD AUTO: 201 X10(3)/MCL (ref 130–400)
PMV BLD AUTO: 9.2 FL (ref 7.4–10.4)
POTASSIUM SERPL-SCNC: 5.1 MMOL/L (ref 3.5–5.1)
PROT SERPL-MCNC: 7.3 GM/DL (ref 5.8–7.6)
PROTHROMBIN TIME: 13.5 SECONDS (ref 12.5–14.5)
RBC # BLD AUTO: 5.01 X10(6)/MCL (ref 4.7–6.1)
SODIUM SERPL-SCNC: 140 MMOL/L (ref 136–145)
WBC # SPEC AUTO: 4.74 X10(3)/MCL (ref 4.5–11.5)

## 2023-11-20 PROCEDURE — 36415 COLL VENOUS BLD VENIPUNCTURE: CPT

## 2023-11-20 PROCEDURE — 85730 THROMBOPLASTIN TIME PARTIAL: CPT

## 2023-11-20 PROCEDURE — 93005 ELECTROCARDIOGRAM TRACING: CPT

## 2023-11-20 PROCEDURE — 86480 TB TEST CELL IMMUN MEASURE: CPT

## 2023-11-20 PROCEDURE — 85027 COMPLETE CBC AUTOMATED: CPT

## 2023-11-20 PROCEDURE — 80053 COMPREHEN METABOLIC PANEL: CPT

## 2023-11-20 PROCEDURE — 85610 PROTHROMBIN TIME: CPT

## 2023-11-20 NOTE — H&P (VIEW-ONLY)
Subjective:        Chief Complaint: No chief complaint on file.      HPI: Santiago Sutton is a 68 y.o. male.  He was referred today for pulmonary evaluation regarding abnormal CT of the chest.  His history is significant for T1a left renal cell carcinoma post partial robotic assisted left nephrectomy per Dr. Hobson on 11/25/2022.  He also underwent a FESS per Dr. Chest on 12/2022.  He has type 2 diabetes mellitus.  He was noted to have multiple bilateral pulmonary nodules and splenomegaly by CT of the chest performed 11/07/2023.  He denies any fever, chills, night sweats, cough, sputum production, or hemoptysis.  He states that he has been losing some weight, although he has been aggressively dieting in face of diagnosis of type 2 diabetes mellitus.  He denies chest pain, shoulder pain, or back pain.  He is a lifetime nonsmoker, has no family history of lung cancer.  He has no pets or animals, and is employed as a .  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  He has no known family history of sarcoidosis.  He has no history of tuberculosis or known exposures to tuberculosis.        Review of patient's allergies indicates:   Allergen Reactions    Meperidine Nausea And Vomiting       Current Outpatient Medications   Medication Instructions    blood sugar diagnostic Strp Test glucose once daily    fluticasone propionate (FLONASE) 50 mcg/actuation nasal spray No dose, route, or frequency recorded.    metFORMIN (GLUCOPHAGE-XR) 500 mg, Oral, With breakfast       Past Medical History:   Diagnosis Date    Acute dacryocystitis     DM (diabetes mellitus)     Kidney stone     Renal malignant tumor        Past Surgical History:   Procedure Laterality Date    CYSTOSCOPY W/ LASER LITHOTRIPSY      Dayton Ribeiro MD    FESS, WITH NASAL SEPTOPLASTY AND TURBINATE REDUCTION Right 12/15/2022    Procedure: FESS, WITH NASAL SEPTOPLASTY AND TURBINATE REDUCTION;  Surgeon: Kingston GREGG  MD Bob;  Location: [facility];  Service: ENT;  Laterality: Right;    NEPHRECTOMY Left 11/25/2022    partial    PROBING OF NASOLACRIMAL DUCT WITH INSERTION OF TUBE Right 12/15/2022    Procedure: PROBING, NASOLACRIMAL DUCT, WITH TUBE INSERTION Right Endoscopic DCR with Hameed Stent Placement;  Surgeon: Kingston Rojas MD;  Location: [facility] OR;  Service: ENT;  Laterality: Right;    VASECTOMY  08/01/1987       Social History     Socioeconomic History    Marital status:    Tobacco Use    Smoking status: Never    Smokeless tobacco: Never   Substance and Sexual Activity    Alcohol use: Not Currently     Comment: No alcohol for 30 yrs    Drug use: Never    Sexual activity: Yes     Partners: Female     Birth control/protection: Partner-Vasectomy     Social Determinants of Health     Financial Resource Strain: Low Risk  (11/17/2023)    Overall Financial Resource Strain (CARDIA)     Difficulty of Paying Living Expenses: Not hard at all   Food Insecurity: No Food Insecurity (11/17/2023)    Hunger Vital Sign     Worried About Running Out of Food in the Last Year: Never true     Ran Out of Food in the Last Year: Never true   Transportation Needs: No Transportation Needs (11/17/2023)    PRAPARE - Transportation     Lack of Transportation (Medical): No     Lack of Transportation (Non-Medical): No   Physical Activity: Unknown (11/17/2023)    Exercise Vital Sign     Days of Exercise per Week: 0 days   Stress: No Stress Concern Present (11/17/2023)    [employer] - Occupational Stress Questionnaire     Feeling of Stress : Not at all   Social Connections: Unknown (11/17/2023)    Social Connection and Isolation Panel [NHANES]     Frequency of Communication with Friends and Family: Twice a week     Frequency of Social Gatherings with Friends and Family: Once a week     Active Member of Clubs or Organizations: Yes     Attends Club or Organization Meetings: More than 4 times per year     Marital  Status:    Housing Stability: Low Risk  (11/17/2023)    Housing Stability Vital Sign     Unable to Pay for Housing in the Last Year: No     Number of Places Lived in the Last Year: 1     Unstable Housing in the Last Year: No       Review of Systems   Constitutional:  Positive for weight loss (actively dieting). Negative for chills, diaphoresis and fever.   HENT:  Negative for hearing loss and nosebleeds.    Respiratory:  Negative for wheezing.    Cardiovascular:  Negative for chest pain and palpitations.   Gastrointestinal:  Negative for blood in stool and melena.   Genitourinary:  Negative for hematuria.   Skin:  Negative for rash.   Neurological:  Negative for headaches.       Objective:   There were no vitals taken for this visit.    Physical Exam  Constitutional:       Appearance: Normal appearance.   HENT:      Mouth/Throat:      Mouth: Mucous membranes are moist.      Pharynx: Oropharynx is clear.   Eyes:      Conjunctiva/sclera: Conjunctivae normal.      Pupils: Pupils are equal, round, and reactive to light.   Cardiovascular:      Rate and Rhythm: Normal rate and regular rhythm.      Heart sounds: No murmur heard.  Pulmonary:      Breath sounds: Normal breath sounds. No wheezing, rhonchi or rales.   Abdominal:      General: Bowel sounds are normal.      Palpations: Abdomen is soft.   Musculoskeletal:      Right lower leg: No edema.      Left lower leg: No edema.   Lymphadenopathy:      Cervical: No cervical adenopathy.   Skin:     General: Skin is warm and dry.   Neurological:      Mental Status: He is alert and oriented to person, place, and time.       Lab:    Lab Results   Component Value Date/Time     10/05/2023 08:51 AM     11/26/2022 05:51 AM    K 4.5 10/05/2023 08:51 AM    K 4.1 11/26/2022 05:51 AM     11/26/2022 05:51 AM    CO2 29 10/05/2023 08:51 AM    CO2 26 11/26/2022 05:51 AM    BUN 31.0 (H) 10/05/2023 08:51 AM    BUN 20 11/26/2022 05:51 AM    CREATININE 1.35 (H)  10/05/2023 08:51 AM    CREATININE 1.27 (H) 11/26/2022 05:51 AM    CALCIUM 10.9 (H) 10/05/2023 08:51 AM    CALCIUM 9.6 11/26/2022 05:51 AM    AST 22 10/05/2023 08:51 AM    ALT 20 10/05/2023 08:51 AM    ALKPHOS 60 10/05/2023 08:51 AM    ALBUMIN 4.8 10/05/2023 08:51 AM    INR 0.97 12/08/2022 08:34 AM    INR 1.1 11/21/2022 08:56 AM    PROTIME 12.7 12/08/2022 08:34 AM    PTT 29.8 12/08/2022 08:34 AM     Lab Results   Component Value Date/Time    WBC 5.00 10/05/2023 08:51 AM    HGB 14.9 10/05/2023 08:51 AM    HCT 44.2 10/05/2023 08:51 AM     10/05/2023 08:51 AM    MCV 85.8 10/05/2023 08:51 AM    RDW 13.0 10/05/2023 08:51 AM     Imaging:   CT chest (11/07/2023, "[facility] [employer] [facility] imaging, my reading of images):  Study is performed with IV contrast.  There is extensive mediastinal adenopathy appreciated, left pretracheal up to approximate 2.9 cm maximum diameter, AP window, left para-aortic, bilateral hilar, and subcarinal. There appears to be extensive subcarinal adenopathy largest left subcarinal measuring 3.5 x 2.3 cm.  There is some slight punctate calcification within the largest of the mediastinal/subcarinal nodes.  There are multiple solid pulmonary parenchymal nodular attenuations most prominent  throughout the right upper lobe, predominantly following a bronchovascular distribution, measuring from a few mm in diameter to largest of just at 1 cm diameter.  There is confluence of nodules noted along the right upper lobe bronchovascular bundles, extending from the hilar region out to the subpleural surface.  There are a few subcentimeter solid nodules noted along the right upper lobe posterior fissure.  There are multiple scattered right lower lobe solid pulmonary nodules that are mostly pleural-based, measuring a few mm to largest just at 6 mm diameter.  Multiple, predominantly pleural-based, solid left lower lobe nodules also noted measuring a few mm up to about 4 mm maximum diameter.  A few scattered left upper lobe  nodules are noted measuring a few mm to about 5 mm largest diameter, also appearing to have predominantly a bronchovascular distribution.  Review of prior CT of the abdomen 10/25/2022 reveals left lower lobe and right lower lobe do a demonstrated multiple bilateral solid pulmonary nodules at that time.  There were a few cuts up into the right upper lobe lower bronchovascular bundle region which revealed some bronchovascular nodularity at that time to a much lesser extent.  Prior CTs of abdomen 01/20/2014 revealed no evidence of pulmonary nodules of the bases at that time.    Assessment:     Multiple bilateral pulmonary nodules as described in above CT chest findings, with extensive mediastinal/hilar/subcarinal adenopathy.  Radiographically pattern is most suspicious for a granulomatous etiology, especially sarcoidosis.  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  This radiographic pattern is not typical for malignancy, and would have a lower suspicion for this at this point.  Splenomegaly  Hypercalcemia  Left 2.4 cm renal cell carcinoma (clear cell carcinoma)    Plan:     I have extensively discussed the above CT chest findings and differential diagnostic possibilities.  CBC with diff, CMP, PT/PTT/INR, and serum QuantiFERON gold.  Will schedule for fiberoptic bronchoscopy with EBUS guided biopsies of mediastinal nodes and fluoroscopy, with possible transbronchial biopsies, brushings, and bronchoalveolar lavage.  Patient unable to scheduled for bronch this week due to work schedule conflicts.  Will schedule asap.      Kari Cha MD, [facility]  Pulmonary/Critical Care

## 2023-11-22 LAB
GAMMA INTERFERON BACKGROUND BLD IA-ACNC: 0.03 IU/ML
M TB IFN-G BLD-IMP: NEGATIVE
M TB IFN-G CD4+ BCKGRND COR BLD-ACNC: 0 IU/ML
M TB IFN-G CD4+CD8+ BCKGRND COR BLD-ACNC: -0.01 IU/ML
MITOGEN IGNF BCKGRD COR BLD-ACNC: 9.97 IU/ML

## 2023-11-27 ENCOUNTER — ANESTHESIA EVENT (OUTPATIENT)
Dept: ENDOSCOPY | Facility: HOSPITAL | Age: 68
End: 2023-11-27
Payer: COMMERCIAL

## 2023-11-28 NOTE — PRE-PROCEDURE INSTRUCTIONS
"[facility]: Outpatient Surgery  Preprocedure Check-In Instructions     Your arrival time for your surgery or procedure is __0900____.  We ask patients to arrive about 2 hours before surgery to allow for enough time to review your health history & medications, start your IV, complete any outstanding labwork or tests, and meet your Anesthesiologist.    Expectations: "Because of inconsistent procedure completion times, an unexpected wait may occur. The Physicians would like you to be here to prepare in the event they run ahead of time. We will make you as comfortable as possible and keep you informed. We apologize in advance if this happens."    You will arrive at [facility], [address], [place].  Enter through the [place] entrance next to the Emergency Room, and come to the 6th floor to the Outpatient Surgery Department.     Visitory Policy:  You are allowed 2 adult visitors to be with you in the hospital. All hospital visitors should be in good current health.  No small children.     What to Bring:  Please have your ID, insurance cards, and all home medication bottles with you at check in.  Bring your CPAP machine if one is used at home.     Fasting:  Nothing to eat or drink after midnight the night before your procedure. This includes no ice, gum, hard candies, and/or tobacco products.  Follow your doctor's instructions for taking any medications on the morning of your procedure.  If no instructions for taking medications were given, do not take any medications but bring your medications in their bottles to your procedure check in.     Follow your doctor's preoperative instructions regarding skin prep, bowel prep, bathing, or showering prior to your procedure.  If any special soaps were provided to you, please use according to your doctor's instructions. If no instructions were given from your doctor, take a good bath or shower with antibacterial soap the night " before and the morning of your procedure.  On the morning of procedure, wear loose, comfortable clothing.  No lotions, makeup, perfumes, colognes, deodorant, or jewelry to your procedure.  Removable items (glasses, contact lenses, dentures, retainers, hearing aids) need to be removed for your procedure.  Bring your storage containers for these items if you wear them.     Artificial nails, body jewelry, eyelash extensions, and/or hair extensions with metal clips are not allowed during your surgery.  If you currently wear any of these items, please arrange for them to be removed prior to your arrival to the hospital.     Outpatient or Same Day Surgeries:  Any patients receiving sedation/anesthesia are advised not to drive for 24 hours after their procedure.  We do not allow patients to drive themselves home after discharge.  If you are going home after your procedure, please have someone available to drive you home from the hospital.        You may call the Outpatient Surgery Department at [phone] with any questions or concerns.  We are looking forward to meeting you and taking great care of you for your procedure.  Thank you for choosing [facility] for your surgical needs.

## 2023-11-28 NOTE — PRE-PROCEDURE INSTRUCTIONS
"[facility]: Outpatient Surgery  Preprocedure Check-In Instructions     Your arrival time for your surgery or procedure is _0600_____.  We ask patients to arrive about 2 hours before surgery to allow for enough time to review your health history & medications, start your IV, complete any outstanding labwork or tests, and meet your Anesthesiologist.    Expectations: "Because of inconsistent procedure completion times, an unexpected wait may occur. The Physicians would like you to be here to prepare in the event they run ahead of time. We will make you as comfortable as possible and keep you informed. We apologize in advance if this happens."    You will arrive at [facility], [address], [place].  Enter through the [place] entrance next to the Emergency Room, and come to the 6th floor to the Outpatient Surgery Department.     Visitory Policy:  You are allowed 2 adult visitors to be with you in the hospital. All hospital visitors should be in good current health.  No small children.     What to Bring:  Please have your ID, insurance cards, and all home medication bottles with you at check in.  Bring your CPAP machine if one is used at home.     Fasting:  Nothing to eat or drink after midnight the night before your procedure. This includes no ice, gum, hard candies, and/or tobacco products.  Follow your doctor's instructions for taking any medications on the morning of your procedure.  If no instructions for taking medications were given, do not take any medications but bring your medications in their bottles to your procedure check in.     Follow your doctor's preoperative instructions regarding skin prep, bowel prep, bathing, or showering prior to your procedure.  If any special soaps were provided to you, please use according to your doctor's instructions. If no instructions were given from your doctor, take a good bath or shower with antibacterial soap the night " before and the morning of your procedure.  On the morning of procedure, wear loose, comfortable clothing.  No lotions, makeup, perfumes, colognes, deodorant, or jewelry to your procedure.  Removable items (glasses, contact lenses, dentures, retainers, hearing aids) need to be removed for your procedure.  Bring your storage containers for these items if you wear them.     Artificial nails, body jewelry, eyelash extensions, and/or hair extensions with metal clips are not allowed during your surgery.  If you currently wear any of these items, please arrange for them to be removed prior to your arrival to the hospital.     Outpatient or Same Day Surgeries:  Any patients receiving sedation/anesthesia are advised not to drive for 24 hours after their procedure.  We do not allow patients to drive themselves home after discharge.  If you are going home after your procedure, please have someone available to drive you home from the hospital.        You may call the Outpatient Surgery Department at [phone] with any questions or concerns.  We are looking forward to meeting you and taking great care of you for your procedure.  Thank you for choosing [facility] for your surgical needs.

## 2023-11-29 ENCOUNTER — ANESTHESIA (OUTPATIENT)
Dept: ENDOSCOPY | Facility: HOSPITAL | Age: 68
End: 2023-11-29
Payer: COMMERCIAL

## 2023-11-29 RX ORDER — ONDANSETRON 2 MG/ML
4 INJECTION INTRAMUSCULAR; INTRAVENOUS DAILY PRN
Status: CANCELLED | OUTPATIENT
Start: 2023-11-29

## 2023-11-29 RX ORDER — DIPHENHYDRAMINE HYDROCHLORIDE 50 MG/ML
25 INJECTION INTRAMUSCULAR; INTRAVENOUS EVERY 6 HOURS PRN
Status: CANCELLED | OUTPATIENT
Start: 2023-11-29

## 2023-11-29 RX ORDER — HYDROMORPHONE HYDROCHLORIDE 2 MG/ML
0.2 INJECTION, SOLUTION INTRAMUSCULAR; INTRAVENOUS; SUBCUTANEOUS EVERY 5 MIN PRN
Status: CANCELLED | OUTPATIENT
Start: 2023-11-29

## 2023-11-29 RX ORDER — SODIUM CHLORIDE, SODIUM GLUCONATE, SODIUM ACETATE, POTASSIUM CHLORIDE AND MAGNESIUM CHLORIDE 30; 37; 368; 526; 502 MG/100ML; MG/100ML; MG/100ML; MG/100ML; MG/100ML
INJECTION, SOLUTION INTRAVENOUS CONTINUOUS
Status: CANCELLED | OUTPATIENT
Start: 2023-11-29 | End: 2023-12-29

## 2023-11-29 RX ORDER — PROCHLORPERAZINE EDISYLATE 5 MG/ML
5 INJECTION INTRAMUSCULAR; INTRAVENOUS EVERY 30 MIN PRN
Status: CANCELLED | OUTPATIENT
Start: 2023-11-29

## 2023-11-29 RX ORDER — HYDROMORPHONE HYDROCHLORIDE 2 MG/ML
0.4 INJECTION, SOLUTION INTRAMUSCULAR; INTRAVENOUS; SUBCUTANEOUS EVERY 5 MIN PRN
Status: CANCELLED | OUTPATIENT
Start: 2023-11-29

## 2023-11-29 RX ORDER — MEPERIDINE HYDROCHLORIDE 25 MG/ML
12.5 INJECTION INTRAMUSCULAR; INTRAVENOUS; SUBCUTANEOUS ONCE
Status: CANCELLED | OUTPATIENT
Start: 2023-11-29 | End: 2023-11-29

## 2023-11-29 RX ORDER — SODIUM CITRATE AND CITRIC ACID MONOHYDRATE 334; 500 MG/5ML; MG/5ML
30 SOLUTION ORAL ONCE
Status: CANCELLED | OUTPATIENT
Start: 2023-11-29 | End: 2023-11-29

## 2023-11-29 RX ORDER — LIDOCAINE HYDROCHLORIDE 10 MG/ML
1 INJECTION, SOLUTION EPIDURAL; INFILTRATION; INTRACAUDAL; PERINEURAL ONCE
Status: CANCELLED | OUTPATIENT
Start: 2023-11-29 | End: 2023-11-29

## 2023-11-29 RX ORDER — ONDANSETRON 2 MG/ML
4 INJECTION INTRAMUSCULAR; INTRAVENOUS ONCE
Status: CANCELLED | OUTPATIENT
Start: 2023-11-29 | End: 2023-11-29

## 2023-11-29 NOTE — PRE-PROCEDURE INSTRUCTIONS
"[facility]: Outpatient Surgery  Preprocedure Instructions    Expectations: "Because of inconsistent procedure completion times, an unexpected wait may occur. The physicians would like you to be here to prepare in the event they run ahead of time. We will make you as comfortable as possible and keep you informed. We apologize in advance if this happens."     Your arrival time for your surgery or procedure is __7:30 am____.  We ask patients to arrive about 2 hours before surgery to allow for enough time to review your health history & medications, start your IV, complete any outstanding labwork or tests, and meet your Anesthesiologist.    We are located at [facility], [address] [place] [address].    Enter through the [facility] entrance next to the Emergency Room, and come to the 6th floor to the Outpatient Surgery Department.    If you are in need of a wheelchair the morning of surgery please call [phone] about 15 minutes before you arrive. Parking is available in our parking garage located off [facility], between the hospital and [facility].      Visitory Policy:  You are allowed 2 adult visitors to be with you in the hospital. Please, no switching visitors in pre-op area. All hospital visitors should be in good current health.  No small children.  We will update you and your family hourly on the progression of surgery and any unexpected delays.      What to Bring:  Please have your ID, insurance cards, and all home medication bottles with you at check in.  Bring your CPAP machine if one is used at home.     Fasting:  Nothing to eat or drink after midnight the night before your procedure. This includes no ice, gum, hard candies, and/or tobacco products.    Medications:  Follow your doctor's instructions for taking any medications on the morning of your procedure.  If no instructions for taking medications were given, do not take any medications but bring your " medications in their bottles to your procedure check in.     Follow your doctor's preoperative instructions regarding skin prep, bowel prep, bathing, or showering prior to your procedure.  If any special soaps were provided to you, please use according to your doctor's instructions. If no instructions were given from your doctor, take a good bath or shower with antibacterial soap the night before and the morning of your procedure.  On the morning of procedure, wear loose, comfortable clothing.  No lotions, makeup, perfumes, colognes, deodorant, or jewelry to your procedure.  Removable items (glasses, contact lenses, dentures, retainers, hearing aids) need to be removed for your procedure.  Bring your storage containers for these items if you wear them.     Artificial nails, body jewelry, eyelash extensions, and/or hair extensions with metal clips are not allowed during your surgery.  If you currently wear any of these items, please arrange for them to be removed prior to your arrival to the hospital.     Outpatient or Same Day Surgeries:  Any patients receiving sedation/anesthesia are advised not to drive for 24 hours after their procedure.  We do not allow patients to drive themselves home after discharge.  If you are going home after your procedure, please have someone available to drive you home from the hospital.     You may call the Outpatient Surgery Department at [phone] with any questions or concerns.  We are looking forward to meeting you and taking great care of you for your procedure.  Thank you for choosing [facility] for your surgical needs.

## 2023-11-29 NOTE — ANESTHESIA PREPROCEDURE EVALUATION
11/29/2023  Santiago Sutton is a 68 y.o., male.    Santiago Sutton    Pre-op Diagnosis: Reactive lymphadenopathy [R59.9]    Procedure(s):  ENDOBRONCHIAL ULTRASOUND (EBUS)     Review of patient's allergies indicates:   Allergen Reactions    Meperidine Nausea And Vomiting       Current Outpatient Medications   Medication Instructions    blood sugar diagnostic Strp Test glucose once daily    cholecalciferol, vitamin D3, (VITAMIN D3 ORAL) 1 tablet, Oral, Daily    fluticasone propionate (FLONASE) 50 mcg/actuation nasal spray No dose, route, or frequency recorded.    metFORMIN (GLUCOPHAGE-XR) 500 mg, Oral, With breakfast    VITAMIN K2 ORAL 1 tablet, Oral, Daily       ENDOBRONCHIAL ULTRASOUND (EBUS);MO BRONCHOSCOPY,DIAGNOSTIC *    Past Medical History:   Diagnosis Date    Acute dacryocystitis     Cough     DM (diabetes mellitus)     Kidney stone     Reactive lymphadenopathy     Renal malignant tumor        Past Surgical History:   Procedure Laterality Date    COLONOSCOPY      CYSTOSCOPY W/ LASER LITHOTRIPSY      Dayton Ribeiro MD    FESS, WITH NASAL SEPTOPLASTY AND TURBINATE REDUCTION Right 12/15/2022    Procedure: FESS, WITH NASAL SEPTOPLASTY AND TURBINATE REDUCTION;  Surgeon: Kingston Rojas MD;  Location: [facility] OR;  Service: ENT;  Laterality: Right;    NEPHRECTOMY Left 11/25/2022    partial    PROBING OF NASOLACRIMAL DUCT WITH INSERTION OF TUBE Right 12/15/2022    Procedure: PROBING, NASOLACRIMAL DUCT, WITH TUBE INSERTION Right Endoscopic DCR with Hameed Stent Placement;  Surgeon: Kingston Rojas MD;  Location: [facility] OR;  Service: ENT;  Laterality: Right;    VASECTOMY  08/01/1987      Lab Results   Component Value Date    WBC 4.74 11/20/2023    HGB 14.7 11/20/2023    HCT 43.6 11/20/2023    MCV 87.0 11/20/2023     11/20/2023   BMP  Lab Results   Component Value Date     11/20/2023    K 5.1 11/20/2023      11/26/2022    CO2 27 11/20/2023    BUN 26.6 (H) 11/20/2023    CREATININE 1.51 (H) 11/20/2023    CALCIUM 10.3 (H) 11/20/2023    ANIONGAP 8 11/26/2022    ESTGFRAFRICA 62 11/26/2022    EGFRNONAA >60 09/16/2021             Pre-op Assessment    I have reviewed the Patient Summary Reports.    I have reviewed the NPO Status.   I have reviewed the Medications.     Review of Systems  Anesthesia Hx:  No problems with previous Anesthesia             Denies Family Hx of Anesthesia complications.    Denies Personal Hx of Anesthesia complications.                    Social:  Non-Smoker       Hematology/Oncology:                                  Oncology Comments: Kidney Cancer     Cardiovascular:  Exercise tolerance: good   Hypertension       Denies Angina.   Denies Orthopnea.  Denies PND.    Denies REHMAN.    Functional Capacity good / => 4 METS                         Renal/:  Chronic Renal Disease renal calculi               Musculoskeletal:  Musculoskeletal Normal                Neurological:  Denies TIA.  Denies CVA.                                    Endocrine:  Diabetes           Psych:  Psychiatric Normal                    Physical Exam  General: Well nourished, Alert and Oriented    Airway:  Mallampati: III   Mouth Opening: Normal  TM Distance: Normal  Tongue: Normal  Neck ROM: Normal ROM    Dental:  Intact    Chest/Lungs:  Clear to auscultation    Heart:  Rate: Normal  Rhythm: Regular Rhythm  No pretibial edema  No carotid bruits      Anesthesia Plan  Type of Anesthesia, risks & benefits discussed:    Anesthesia Type: Gen Supraglottic Airway  Intra-op Monitoring Plan: Standard ASA Monitors  Post Op Pain Control Plan: multimodal analgesia  Induction:  IV  Airway Plan: Direct, Post-Induction  Informed Consent: Informed consent signed with the Patient and all parties understand the risks and agree with anesthesia plan.  All questions answered. Patient consented to blood products? No  ASA Score: 3  Day of Surgery  Review of History & Physical: H&P Update referred to the surgeon/provider.  Anesthesia Plan Notes: Disc possible need to convert to GETA    Discussed Anesthetic Plan w/ Pt/Family. Questions Entertained. Accepted.      Ready For Surgery From Anesthesia Perspective.     .

## 2023-11-30 ENCOUNTER — HOSPITAL ENCOUNTER (OUTPATIENT)
Facility: HOSPITAL | Age: 68
Discharge: HOME | End: 2023-11-30
Attending: INTERNAL MEDICINE | Admitting: INTERNAL MEDICINE
Payer: COMMERCIAL

## 2023-11-30 VITALS
OXYGEN SATURATION: 100 % | DIASTOLIC BLOOD PRESSURE: 88 MMHG | HEART RATE: 65 BPM | RESPIRATION RATE: 18 BRPM | SYSTOLIC BLOOD PRESSURE: 145 MMHG | HEIGHT: 73 IN | WEIGHT: 173.31 LBS | BODY MASS INDEX: 22.97 KG/M2 | TEMPERATURE: 98 F

## 2023-11-30 LAB — POCT GLUCOSE: 121 MG/DL (ref 70–110)

## 2023-11-30 PROCEDURE — D9220A PRA ANESTHESIA: ICD-10-PCS | Mod: ANES,,, | Performed by: ANESTHESIOLOGY

## 2023-11-30 PROCEDURE — C1726 CATH, BAL DIL, NON-VASCULAR: HCPCS | Performed by: INTERNAL MEDICINE

## 2023-11-30 PROCEDURE — 25000003 PHARM REV CODE 250: Performed by: NURSE ANESTHETIST, CERTIFIED REGISTERED

## 2023-11-30 PROCEDURE — 31652 BRONCH EBUS SAMPLNG 1/2 NODE: CPT | Performed by: INTERNAL MEDICINE

## 2023-11-30 PROCEDURE — 37000009 HC ANESTHESIA EA ADD 15 MINS: Performed by: INTERNAL MEDICINE

## 2023-11-30 PROCEDURE — 25000003 PHARM REV CODE 250: Performed by: INTERNAL MEDICINE

## 2023-11-30 PROCEDURE — D9220A PRA ANESTHESIA: ICD-10-PCS | Mod: CRNA,,, | Performed by: NURSE ANESTHETIST, CERTIFIED REGISTERED

## 2023-11-30 PROCEDURE — 63600175 PHARM REV CODE 636 W HCPCS: Performed by: NURSE ANESTHETIST, CERTIFIED REGISTERED

## 2023-11-30 PROCEDURE — 37000008 HC ANESTHESIA 1ST 15 MINUTES: Performed by: INTERNAL MEDICINE

## 2023-11-30 PROCEDURE — 25000003 PHARM REV CODE 250: Performed by: ANESTHESIOLOGY

## 2023-11-30 PROCEDURE — D9220A PRA ANESTHESIA: Mod: CRNA,,, | Performed by: NURSE ANESTHETIST, CERTIFIED REGISTERED

## 2023-11-30 PROCEDURE — D9220A PRA ANESTHESIA: Mod: ANES,,, | Performed by: ANESTHESIOLOGY

## 2023-11-30 RX ORDER — PROPOFOL 10 MG/ML
VIAL (ML) INTRAVENOUS
Status: DISCONTINUED | OUTPATIENT
Start: 2023-11-30 | End: 2023-11-30

## 2023-11-30 RX ORDER — SODIUM CHLORIDE 9 MG/ML
INJECTION, SOLUTION INTRAVENOUS CONTINUOUS
Status: DISCONTINUED | OUTPATIENT
Start: 2023-11-30 | End: 2023-11-30 | Stop reason: HOSPADM

## 2023-11-30 RX ORDER — LIDOCAINE HYDROCHLORIDE 40 MG/ML
4 SOLUTION TOPICAL
Status: DISCONTINUED | OUTPATIENT
Start: 2023-11-30 | End: 2023-11-30 | Stop reason: HOSPADM

## 2023-11-30 RX ORDER — SODIUM CHLORIDE, SODIUM GLUCONATE, SODIUM ACETATE, POTASSIUM CHLORIDE AND MAGNESIUM CHLORIDE 30; 37; 368; 526; 502 MG/100ML; MG/100ML; MG/100ML; MG/100ML; MG/100ML
INJECTION, SOLUTION INTRAVENOUS CONTINUOUS
Status: DISCONTINUED | OUTPATIENT
Start: 2023-11-30 | End: 2023-11-30 | Stop reason: HOSPADM

## 2023-11-30 RX ORDER — LIDOCAINE HYDROCHLORIDE 20 MG/ML
INJECTION INTRAVENOUS
Status: DISCONTINUED | OUTPATIENT
Start: 2023-11-30 | End: 2023-11-30

## 2023-11-30 RX ORDER — GLYCOPYRROLATE 0.2 MG/ML
INJECTION INTRAMUSCULAR; INTRAVENOUS
Status: DISCONTINUED | OUTPATIENT
Start: 2023-11-30 | End: 2023-11-30

## 2023-11-30 RX ORDER — SODIUM CHLORIDE, SODIUM LACTATE, POTASSIUM CHLORIDE, CALCIUM CHLORIDE 600; 310; 30; 20 MG/100ML; MG/100ML; MG/100ML; MG/100ML
INJECTION, SOLUTION INTRAVENOUS CONTINUOUS
Status: DISCONTINUED | OUTPATIENT
Start: 2023-11-30 | End: 2023-11-30 | Stop reason: HOSPADM

## 2023-11-30 RX ADMIN — LIDOCAINE HYDROCHLORIDE 4 ML: 40 SOLUTION TOPICAL at 09:11

## 2023-11-30 RX ADMIN — GLYCOPYRROLATE 0.2 MG: 0.2 INJECTION INTRAMUSCULAR; INTRAVENOUS at 09:11

## 2023-11-30 RX ADMIN — SODIUM CHLORIDE, SODIUM GLUCONATE, SODIUM ACETATE, POTASSIUM CHLORIDE AND MAGNESIUM CHLORIDE 1000 ML: 526; 502; 368; 37; 30 INJECTION, SOLUTION INTRAVENOUS at 09:11

## 2023-11-30 RX ADMIN — LIDOCAINE HYDROCHLORIDE 100 MG: 20 INJECTION INTRAVENOUS at 09:11

## 2023-11-30 RX ADMIN — PROPOFOL 150 MG: 10 INJECTION, EMULSION INTRAVENOUS at 09:11

## 2023-11-30 RX ADMIN — REMIFENTANIL HYDROCHLORIDE 0.12 MCG/KG/MIN: 1 INJECTION, POWDER, LYOPHILIZED, FOR SOLUTION INTRAVENOUS at 09:11

## 2023-11-30 NOTE — PROCEDURES
[facility]  Bronchoscopy Procedure Note    SUMMARY     Date of Procedure: 11/30/2023    Procedure:  Flexible bronchoscopy with EBUS/TBNA    Performed by: Joao Eid MD    Pre-Procedure Diagnosis:  Mediastinal and hilar adenopathy    Post-Procedure Diagnosis:  Same    Anesthesia: General Anesthesia        Description of the Findings of the Procedure:     Patient was consented for the procedure with all risk and benefit of the procedure explained in detail.  Patient was given the opportunity to ask questions and all concerns were answered.  The bronchocope was inserted into the main airway via the laryngeal mask airway. An anatomical survey was done of the main airways and the subsegmental bronchus.  There were no suspicious appearing endobronchial lesions located within either left or right tracheobronchial trees.  There were several areas of superficial blood vessels noted within the right upper lobe and right middle lobe bronchi, no active bleeding identified.  The flexible bronchoscope was withdrawn and the linear endobronchial ultrasound scope was introduced into the airway.  Significant adenopathy was noted at station 4 R, 4 L, 10 L and station 7.  Three transbronchial needle aspiration passes were taken at station 10 L and sent for pathologic analysis.  There was minimal bleeding and all secretions cleared at completion of procedure.      Complications: None; patient tolerated the procedure well.    Estimated Blood Loss (EBL): Minimal           Specimens:   Station 10 L TBNA       Condition: Good        Disposition: PACU - hemodynamically stable.          Joao Eid MD  [facility]

## 2023-11-30 NOTE — INTERVAL H&P NOTE
The patient has been examined and the H&P has been reviewed:    I concur with the findings and no changes have occurred since H&P was written.    Procedure risks, benefits and alternative options discussed and understood by patient/family.        Bronchoscopy with EBUS/TBNA of mediastinal and hilar adenopathy today.  Patient confirms no anticoagulant or antiplatelet medications.        There are no hospital problems to display for this patient.

## 2023-11-30 NOTE — DISCHARGE SUMMARY
[facility] - Bronchoscopy Lab  Discharge Note  Short Stay    Procedure(s) (LRB):  ENDOBRONCHIAL ULTRASOUND (EBUS) (N/A)      OUTCOME: Patient tolerated treatment/procedure well without complication and is now ready for discharge.    DISPOSITION: Home or Self Care    FINAL DIAGNOSIS:  <principal problem not specified>    FOLLOWUP: In clinic    DISCHARGE INSTRUCTIONS:  No discharge procedures on file.      Clinical Reference Documents Added to Patient Instructions         Document    BRONCHOSCOPY, DIAGNOSTIC (ENGLISH)    GENERAL ANESTHESIA DISCHARGE INSTRUCTIONS (ENGLISH)            TIME SPENT ON DISCHARGE: <30 minutes

## 2023-11-30 NOTE — DISCHARGE INSTRUCTIONS
- NO driving and NO alcohol consumption for 24 hours and if you are taking narcotic pain medication.    - Mild cough and/or sore throat is expected. Red streaks may be present in mucus. Hard candies, peppermints, throat lozenges, gargling with warm water and salt may help    - Although no incisions were made monitor for signs of infection such as fever or chills.    - Report to your nearest ER and/or call your doctor if you experience any SUDDEN/SEVERE chest/abdominal pain, weakness, trouble breathing, chest pain, uncontrolled pain, coughing up more than 8oz of blood.

## 2023-12-01 NOTE — ANESTHESIA POSTPROCEDURE EVALUATION
Anesthesia Post Evaluation    Patient: Santiago Sutton    Procedure(s) Performed: Procedure(s) (LRB):  ENDOBRONCHIAL ULTRASOUND (EBUS) (N/A)    Final Anesthesia Type: general      Patient location during evaluation: PACU  Patient participation: Yes- Able to Participate  Level of consciousness: awake  Post-procedure vital signs: reviewed and stable  Pain management: adequate  Airway patency: patent    PONV status at discharge: vomiting (controlled) and nausea (controlled)  Anesthetic complications: no      Cardiovascular status: hemodynamically stable  Respiratory status: spontaneous ventilation and unassisted  Hydration status: euvolemic  Follow-up not needed.  Comments:                  Vitals Value Taken Time   /88 11/30/23 1109   Temp 36.6 °C (97.8 °F) 11/30/23 1009   Pulse 65 11/30/23 1133   Resp 18 11/30/23 1009   SpO2 100 % 11/30/23 1133         No case tracking events are documented in the log.      Pain/Ramya Score: Ramya Score: 10 (11/30/2023 11:05 AM)

## 2023-12-04 LAB — PSYCHE PATHOLOGY RESULT: NORMAL

## 2023-12-08 ENCOUNTER — LAB VISIT (OUTPATIENT)
Dept: LAB | Facility: HOSPITAL | Age: 68
End: 2023-12-08
Attending: INTERNAL MEDICINE
Payer: COMMERCIAL

## 2023-12-08 DIAGNOSIS — R59.9 GRANULOMATOUS ADENOPATHY: ICD-10-CM

## 2023-12-08 PROCEDURE — 87449 NOS EACH ORGANISM AG IA: CPT

## 2023-12-08 PROCEDURE — 87899 AGENT NOS ASSAY W/OPTIC: CPT

## 2023-12-08 PROCEDURE — 36415 COLL VENOUS BLD VENIPUNCTURE: CPT

## 2023-12-09 LAB
CRYPTOC AG SER QL IA.RAPID: NEGATIVE
CRYPTOC AG TITR CSF IA: NORMAL {TITER}

## 2023-12-10 LAB
1,3 BETA GLUCAN SER QL: NEGATIVE
1,3 BETA GLUCAN SER-MCNC: <31 PG/ML

## 2023-12-11 ENCOUNTER — LAB VISIT (OUTPATIENT)
Dept: LAB | Facility: HOSPITAL | Age: 68
End: 2023-12-11
Attending: INTERNAL MEDICINE
Payer: COMMERCIAL

## 2023-12-11 DIAGNOSIS — R59.9 GRANULOMATOUS ADENOPATHY: ICD-10-CM

## 2023-12-11 PROCEDURE — 30000890 GENERAL MISCELLANEOUS TEST (BEAKER)

## 2023-12-11 PROCEDURE — 86353 LYMPHOCYTE TRANSFORMATION: CPT

## 2023-12-11 PROCEDURE — 80503 PATH CLIN CONSLTJ SF 5-20: CPT

## 2023-12-11 PROCEDURE — 30000890 HC MISC. SEND OUT TEST

## 2023-12-11 PROCEDURE — 36415 COLL VENOUS BLD VENIPUNCTURE: CPT

## 2023-12-26 LAB — BEAKER SEE SCANNED REPORT: NORMAL

## 2024-01-22 ENCOUNTER — OFFICE VISIT (OUTPATIENT)
Dept: ORTHOPEDICS | Facility: CLINIC | Age: 69
End: 2024-01-22
Payer: COMMERCIAL

## 2024-01-22 ENCOUNTER — HOSPITAL ENCOUNTER (OUTPATIENT)
Dept: RADIOLOGY | Facility: CLINIC | Age: 69
Discharge: HOME | End: 2024-01-22
Attending: ORTHOPAEDIC SURGERY
Payer: COMMERCIAL

## 2024-01-22 VITALS
BODY MASS INDEX: 22.93 KG/M2 | SYSTOLIC BLOOD PRESSURE: 148 MMHG | WEIGHT: 173 LBS | HEIGHT: 73 IN | HEART RATE: 83 BPM | DIASTOLIC BLOOD PRESSURE: 80 MMHG

## 2024-01-22 DIAGNOSIS — M17.11 LOCALIZED OSTEOARTHRITIS OF RIGHT KNEE: ICD-10-CM

## 2024-01-22 DIAGNOSIS — M25.461 EFFUSION OF RIGHT KNEE JOINT: ICD-10-CM

## 2024-01-22 DIAGNOSIS — M25.561 ACUTE PAIN OF RIGHT KNEE: Primary | ICD-10-CM

## 2024-01-22 DIAGNOSIS — M25.561 ACUTE PAIN OF RIGHT KNEE: ICD-10-CM

## 2024-01-22 PROCEDURE — 1160F RVW MEDS BY RX/DR IN RCRD: CPT | Mod: CPTII,,, | Performed by: ORTHOPAEDIC SURGERY

## 2024-01-22 PROCEDURE — 73564 X-RAY EXAM KNEE 4 OR MORE: CPT | Mod: RT,,, | Performed by: ORTHOPAEDIC SURGERY

## 2024-01-22 PROCEDURE — 3079F DIAST BP 80-89 MM HG: CPT | Mod: CPTII,,, | Performed by: ORTHOPAEDIC SURGERY

## 2024-01-22 PROCEDURE — 3077F SYST BP >= 140 MM HG: CPT | Mod: CPTII,,, | Performed by: ORTHOPAEDIC SURGERY

## 2024-01-22 PROCEDURE — 1159F MED LIST DOCD IN RCRD: CPT | Mod: CPTII,,, | Performed by: ORTHOPAEDIC SURGERY

## 2024-01-22 PROCEDURE — 99204 OFFICE O/P NEW MOD 45 MIN: CPT | Mod: 25,,, | Performed by: ORTHOPAEDIC SURGERY

## 2024-01-22 PROCEDURE — 20610 DRAIN/INJ JOINT/BURSA W/O US: CPT | Mod: RT,,, | Performed by: ORTHOPAEDIC SURGERY

## 2024-01-22 PROCEDURE — 3008F BODY MASS INDEX DOCD: CPT | Mod: CPTII,,, | Performed by: ORTHOPAEDIC SURGERY

## 2024-01-22 RX ORDER — LIDOCAINE HYDROCHLORIDE 20 MG/ML
3 INJECTION, SOLUTION INFILTRATION; PERINEURAL
Status: DISCONTINUED | OUTPATIENT
Start: 2024-01-22 | End: 2024-01-22 | Stop reason: HOSPADM

## 2024-01-22 RX ORDER — METHYLPREDNISOLONE ACETATE 80 MG/ML
80 INJECTION, SUSPENSION INTRA-ARTICULAR; INTRALESIONAL; INTRAMUSCULAR; SOFT TISSUE
Status: DISCONTINUED | OUTPATIENT
Start: 2024-01-22 | End: 2024-01-22 | Stop reason: HOSPADM

## 2024-01-22 RX ADMIN — METHYLPREDNISOLONE ACETATE 80 MG: 80 INJECTION, SUSPENSION INTRA-ARTICULAR; INTRALESIONAL; INTRAMUSCULAR; SOFT TISSUE at 09:01

## 2024-01-22 RX ADMIN — LIDOCAINE HYDROCHLORIDE 3 MG: 20 INJECTION, SOLUTION INFILTRATION; PERINEURAL at 09:01

## 2024-01-22 NOTE — PROCEDURES
Large Joint Aspiration/Injection: R knee    Date/Time: 1/22/2024 9:00 AM    Performed by: Aldo Mcguire MD  Authorized by: Aldo Mcguire MD    Consent Done?:  Yes (Verbal)  Indications:  Pain  Site marked: the procedure site was marked    Prep: patient was prepped and draped in usual sterile fashion    Approach:  Anterolateral  Location:  Knee  Site:  R knee  Medications:  3 mg LIDOcaine HCL 20 mg/ml (2%) 20 mg/mL (2 %); 80 mg methylPREDNISolone acetate 80 mg/mL  Patient tolerance:  Patient tolerated the procedure well with no immediate complications

## 2024-01-22 NOTE — PROGRESS NOTES
"Subjective:    CC: Pain of the Right Knee (Right knee pain. Pt slipped on ice 1/16/23 and has progressively gotten worse. Pain is off and on. Has relief when elevated. Has a lot of sensitivity. Has swelling. Not able to bend it at all. )       HPI:  Patient comes in today for his 1st visit.  Patient complains of right knee pain and swelling.  Patient states he slipped on some ice, his knee bent backwards.  Since then he has been having some swelling, stiffness more pain with bending of his knee.  Denies any instability he denies any previous injuries he denies other complaints.    ROS: Refer to HPI for pertinent ROS. All other 12 point systems negative.    Objective:  Vitals:    01/22/24 0909   BP: (!) 148/80   Pulse: 83   Weight: 78.5 kg (173 lb)   Height: 6' 1" (1.854 m)        Physical Exam:  The patient is well-nourished, well-developed and in no apparent distress, pleasant and cooperative. Examination of the right lower extremity compartments are soft and warm. Skin is intact. There are no signs or symptoms of DVT or infection. There is a large joint effusion. There is no erythema. Tender to palpation along the mediolateral joint line , right knee range of motion is 0-80. The knee is stable to exam with varus and valgus stressing. Negative anterior and posterior drawer. Negative Lachman´s.  Questionable Mandy's test. Patella grind is positive, Negative for apprehension. Neurovascularly intact distally.    Images:  X-rays four views right knee demonstrate some mild arthritic changes. Images Reviewed and discussed with patient.    Assessment:  1. Acute pain of right knee  - X-Ray Knee Complete 4 Or More Views Right; Future    2. Effusion of right knee joint  - Large Joint Aspiration/Injection: R knee    3. Localized osteoarthritis of right knee  - Large Joint Aspiration/Injection: R knee        Plan:  At this time we discussed his physical exam and x-ray findings.  Patient has a large joint effusion.  Under " sterile technique he tolerated aspiration through the superolateral portal.  A proximally 70 cc of clear yellow fluid was removed.  He also received a steroid lidocaine injection.  He was given a pamphlet on knee range of motion strengthening exercises.  I would like see back in 4 weeks to see how he is progressing.    Follow UP: No follow-ups on file.    Large Joint Aspiration/Injection: R knee    Date/Time: 1/22/2024 9:00 AM    Performed by: Aldo Mcguire MD  Authorized by: Aldo Mcguire MD    Consent Done?:  Yes (Verbal)  Indications:  Pain  Site marked: the procedure site was marked    Prep: patient was prepped and draped in usual sterile fashion    Approach:  Anterolateral  Location:  Knee  Site:  R knee  Medications:  3 mg LIDOcaine HCL 20 mg/ml (2%) 20 mg/mL (2 %); 80 mg methylPREDNISolone acetate 80 mg/mL  Patient tolerance:  Patient tolerated the procedure well with no immediate complications

## 2024-02-14 ENCOUNTER — LAB VISIT (OUTPATIENT)
Dept: LAB | Facility: HOSPITAL | Age: 69
End: 2024-02-14
Attending: INTERNAL MEDICINE
Payer: COMMERCIAL

## 2024-02-14 ENCOUNTER — OFFICE VISIT (OUTPATIENT)
Dept: HEMATOLOGY/ONCOLOGY | Facility: CLINIC | Age: 69
End: 2024-02-14
Payer: COMMERCIAL

## 2024-02-14 VITALS
DIASTOLIC BLOOD PRESSURE: 82 MMHG | OXYGEN SATURATION: 98 % | HEIGHT: 73 IN | RESPIRATION RATE: 17 BRPM | BODY MASS INDEX: 22.8 KG/M2 | HEART RATE: 55 BPM | SYSTOLIC BLOOD PRESSURE: 156 MMHG | WEIGHT: 172 LBS | TEMPERATURE: 98 F

## 2024-02-14 DIAGNOSIS — C64.2 CLEAR CELL RENAL CELL CARCINOMA, LEFT: Primary | ICD-10-CM

## 2024-02-14 DIAGNOSIS — R91.8 LUNG NODULES: ICD-10-CM

## 2024-02-14 DIAGNOSIS — C64.2 CLEAR CELL RENAL CELL CARCINOMA, LEFT: ICD-10-CM

## 2024-02-14 DIAGNOSIS — R59.1 LYMPHADENOPATHY: ICD-10-CM

## 2024-02-14 LAB
ALBUMIN SERPL-MCNC: 4 G/DL (ref 3.4–4.8)
ALBUMIN/GLOB SERPL: 1.3 RATIO (ref 1.1–2)
ALP SERPL-CCNC: 68 UNIT/L (ref 40–150)
ALT SERPL-CCNC: 21 UNIT/L (ref 0–55)
AST SERPL-CCNC: 24 UNIT/L (ref 5–34)
BASOPHILS # BLD AUTO: 0.02 X10(3)/MCL
BASOPHILS NFR BLD AUTO: 0.5 %
BILIRUB SERPL-MCNC: 0.4 MG/DL
BUN SERPL-MCNC: 20.7 MG/DL (ref 8.4–25.7)
CALCIUM SERPL-MCNC: 10.3 MG/DL (ref 8.8–10)
CHLORIDE SERPL-SCNC: 104 MMOL/L (ref 98–107)
CO2 SERPL-SCNC: 25 MMOL/L (ref 23–31)
CREAT SERPL-MCNC: 1.37 MG/DL (ref 0.73–1.18)
EOSINOPHIL # BLD AUTO: 0.22 X10(3)/MCL (ref 0–0.9)
EOSINOPHIL NFR BLD AUTO: 5.7 %
ERYTHROCYTE [DISTWIDTH] IN BLOOD BY AUTOMATED COUNT: 13 % (ref 11.5–17)
GFR SERPLBLD CREATININE-BSD FMLA CKD-EPI: 56 MLS/MIN/1.73/M2
GLOBULIN SER-MCNC: 3.1 GM/DL (ref 2.4–3.5)
GLUCOSE SERPL-MCNC: 145 MG/DL (ref 82–115)
HCT VFR BLD AUTO: 41.8 % (ref 42–52)
HGB BLD-MCNC: 14.1 G/DL (ref 14–18)
IMM GRANULOCYTES # BLD AUTO: 0 X10(3)/MCL (ref 0–0.04)
IMM GRANULOCYTES NFR BLD AUTO: 0 %
LYMPHOCYTES # BLD AUTO: 0.97 X10(3)/MCL (ref 0.6–4.6)
LYMPHOCYTES NFR BLD AUTO: 25.3 %
MCH RBC QN AUTO: 29.4 PG (ref 27–31)
MCHC RBC AUTO-ENTMCNC: 33.7 G/DL (ref 33–36)
MCV RBC AUTO: 87.3 FL (ref 80–94)
MONOCYTES # BLD AUTO: 0.3 X10(3)/MCL (ref 0.1–1.3)
MONOCYTES NFR BLD AUTO: 7.8 %
NEUTROPHILS # BLD AUTO: 2.32 X10(3)/MCL (ref 2.1–9.2)
NEUTROPHILS NFR BLD AUTO: 60.7 %
PLATELET # BLD AUTO: 170 X10(3)/MCL (ref 130–400)
PMV BLD AUTO: 9 FL (ref 7.4–10.4)
POTASSIUM SERPL-SCNC: 4.6 MMOL/L (ref 3.5–5.1)
PROT SERPL-MCNC: 7.1 GM/DL (ref 5.8–7.6)
RBC # BLD AUTO: 4.79 X10(6)/MCL (ref 4.7–6.1)
SODIUM SERPL-SCNC: 139 MMOL/L (ref 136–145)
WBC # SPEC AUTO: 3.83 X10(3)/MCL (ref 4.5–11.5)

## 2024-02-14 PROCEDURE — 1101F PT FALLS ASSESS-DOCD LE1/YR: CPT | Mod: CPTII,S$GLB,, | Performed by: INTERNAL MEDICINE

## 2024-02-14 PROCEDURE — 3288F FALL RISK ASSESSMENT DOCD: CPT | Mod: CPTII,S$GLB,, | Performed by: INTERNAL MEDICINE

## 2024-02-14 PROCEDURE — 80053 COMPREHEN METABOLIC PANEL: CPT

## 2024-02-14 PROCEDURE — 1126F AMNT PAIN NOTED NONE PRSNT: CPT | Mod: CPTII,S$GLB,, | Performed by: INTERNAL MEDICINE

## 2024-02-14 PROCEDURE — 99214 OFFICE O/P EST MOD 30 MIN: CPT | Mod: S$GLB,,, | Performed by: INTERNAL MEDICINE

## 2024-02-14 PROCEDURE — 3077F SYST BP >= 140 MM HG: CPT | Mod: CPTII,S$GLB,, | Performed by: INTERNAL MEDICINE

## 2024-02-14 PROCEDURE — 1159F MED LIST DOCD IN RCRD: CPT | Mod: CPTII,S$GLB,, | Performed by: INTERNAL MEDICINE

## 2024-02-14 PROCEDURE — 3008F BODY MASS INDEX DOCD: CPT | Mod: CPTII,S$GLB,, | Performed by: INTERNAL MEDICINE

## 2024-02-14 PROCEDURE — 85025 COMPLETE CBC W/AUTO DIFF WBC: CPT

## 2024-02-14 PROCEDURE — 36415 COLL VENOUS BLD VENIPUNCTURE: CPT

## 2024-02-14 PROCEDURE — 3079F DIAST BP 80-89 MM HG: CPT | Mod: CPTII,S$GLB,, | Performed by: INTERNAL MEDICINE

## 2024-02-14 PROCEDURE — 1160F RVW MEDS BY RX/DR IN RCRD: CPT | Mod: CPTII,S$GLB,, | Performed by: INTERNAL MEDICINE

## 2024-02-14 PROCEDURE — 99999 PR PBB SHADOW E&M-EST. PATIENT-LVL IV: CPT | Mod: PBBFAC,,, | Performed by: INTERNAL MEDICINE

## 2024-02-14 NOTE — PROGRESS NOTES
HEMATOLOGY/ONCOLOGY OFFICE CLINIC VISIT    Visit Information:    Initial Evaluation: 10/25/2023  Referring Provider:  Dr Anderson Hobson  Other providers: Dr Stephane Martines (PCP)  Code status: Not addressed    Diagnosis:  pT1a G2-3 Left RCC  Lymphadenopathy    Present treatment:  Observation    Treatment/Oncology history:  11/25/2022 Left robot assisted laparoscopic partial nephrectomy       Plan of care:     Imaging:  CT AP 1/20/2014: There is a nonspecific left para-aortic enlarged lymph node measuring 1.4 cm x 1.0 cm. The liver, spleen, pancreas and aorta  are within normal limits on this unenhanced study Prominent obstructing proximal right ureteral stone.  CT AP 10/25/2022:  Small pleural-based nodules in both lower lobes measuring 4 mm or less and appears stable compared to prior exam from 01/20/2014.  Partially exophytic enhancing mass in the posteromedial left kidney upper lobe measuring 2.2 x 3.0 x 2.7 cm.  No invasion of the renal sinus fat.  Spleen normal.  Multiple enlarged mesenteric and retroperitoneal lymph nodes, similar to the prior exam.  Reference mesenteric nodes measuring 1.2 cm, previously 2.6 cm.  No aggressive osseous lesions.  CT AP 06/13/2023 outside facility:  7 mm pleural-based nodule present in the posterior right lower lobe.  Few small nodules present in the left lower lobe measuring up to 6 mm.  An abnormal enlarged paraesophageal lymph node measured 2.8 cm.  No liver lesion.  Spleen is mildly enlarged measuring 13 cm.  Partial nephrectomy defect present at the left renal dorsal cortex.  Multiple small lymph node present at the gastroesophageal junction.  Enlarged lymph node present in the small bowel mesentery with a representative lymph node measuring 2.9 x 1.8 cm.  A 2nd representative lymph node measured 2.7 x 2.3 cm.  Multiple mildly enlarged retroperitoneum lymph nodes.  Representative infrarenal left periaortic lymph node measuring 1 cm.  Impression: Abnormal enlarged periesophageal  lymph nodes.  Mild splenomegaly.  Multiple enlarged lymph nodes in the small bowel mesentery.  Mildly enlarged retroperitoneal lymph node.  Etiology uncertain with differential consideration including both low-grade lymphoproliferative disease and metastatic renal disease.  Small pulmonary nodules in the lung bases, metastasis is not excluded.  CT AP 2023 outside facility: Small nodules seen in lung bases, unchanged.  Spleen is enlarged measuring 13.9 cm.  No significant hepatic steatosis.  No liver lesion seen.  Partial nephrectomy defect is present at the dorsal cortex of the left renal upper pole.  5 mm nonobstructive stone in the left renal lower pole.  Enlarged lymph node with faint calcifications noted surrounding the distal esophagus.  Multiple abnormal enlarged nodes present in the small bowel mesentery, also unchanged.  Retroperitoneal lymph nodes are increased in number but small in size, stable.  Impression:  Stable partial nephrectomy defect in the left kidney.  Enlarged periesophageal lymph node, splenomegaly, and enlarged lymph node in the small bowel mesentery unchanged.  ? CLL  CT Chest 2023 @ [facility]. Constellation of findings strongly suggestive of pulmonary sarcoidosis with fairly extensive perilymphatic distribution nodules within the perihilar aspects of the right and left lung along with mediastinal, hilar, and subcarinal adenopathy. Pulmonary consultation and follow-up is recommended.  2. No acute airspace consolidation or pulmonary fibrosis. No other lymphadenopathy within the chest seen.  CT A/P 2024 @ [facility]:  1. Interval left wedge partial nephrectomy. Small benign-appearing left upper pole renal cyst has shown size decrease with few stable benign (Bosniak class I) renal cysts. No developing or enlarging focal renal lesion. No evidence to suggest metastatic disease identified.  2. Stable non-obstructing left lower pole renal stone measuring about 4 mm. No  ureteral calculus or obstructive uropathy.  3. Scattered enlarged mesenteric lymph nodes appear similar in number but mildly decreased in size. No enlarging retroperitoneal or mesenteric lymph nodes identified. Lower paraesophageal posterior mediastinal lymph nodes noted within the included lower thorax appears similar. Borderline splenomegaly also noted. Adenopathy related to sarcoidosis. The most likely.  4. Stable thickened appearance of the appendix without evidence for acute appendicitis. Minimal non-inflamed colonic diverticulosis also noted. No focal inflammatory process identified.   5. Right indirect type inguinal hernia with knuckle of small bowel extending into the right deep inguinal ring. No bowel obstruction.  6. Stable mild prostatomegaly.       Pathology:  11/25/2022:  Left kidney, partial nephrectomy:  Clear cell carcinoma, grade 2-3, measuring 2.4 cm.  Tumor permeated is the capsule but does not extend through it into perirenal soft tissues.  Margins free of tumor.  Sarcomatoid features absent.  Rhabdoid features absent.  Tumor necrosis absent.  Lymphovascular invasion negative.  pT1a G2-3    CLINICAL HISTORY:       Patient: Santiago Sutton is a very pleasant 68 y.o. male kindly referred for lymphadenopathy.     Patient's lymphadenopathy has been mentioned on CT's AP at least since January 20, 2014. At that time LN were described as nonspecific left para-aortic lymph nodes.  CT scan done in Oct 2022, June and September 2023 mentioned with mesenteric and retroperitoneal lymphadenopathy and splenomegaly is described in the scans of this year only.  CT abdomen pelvis 09/21/2023 with unchanged mesenteric lymph nodes, retroperitoneal lymph node increased in number but small in size and stable.  Enlarged periesophageal lymph node again noted.  Splenomegaly has been present in June and September 2023 scans but not in 2014.    Patient with h/o left RCC s/p Left robot assisted laparoscopic partial nephrectomy  on 11/25/2022. He has recovered well from surgery.    Patient is here today with his wife.  He feels good and voices no concerns.  No fever, chills, sweats.  No chest pain or short of breath.  No unintentional weight loss.      Chief Complaint: Results      Interval History:    Patient presents today for follow up to discuss CT scan results, accompanied by his wife. He reports a fall last month, slipped on ice. He is being followed by ortho. Otherwise, he is doing well.  No fever, chills, sweats.  No chest pain or short of breath.  CT scan and recommendations discussed with them.        Past Medical History:   Diagnosis Date    Acute dacryocystitis     Cough     DM (diabetes mellitus)     Kidney stone     Reactive lymphadenopathy     Renal malignant tumor       Past Surgical History:   Procedure Laterality Date    COLONOSCOPY      CYSTOSCOPY W/ LASER LITHOTRIPSY      Dayton Ribeiro MD    ENDOBRONCHIAL ULTRASOUND N/A 11/30/2023    Procedure: ENDOBRONCHIAL ULTRASOUND (EBUS);  Surgeon: Joao Eid MD;  Location: [facility];  Service: Pulmonary;  Laterality: N/A;    FESS, WITH NASAL SEPTOPLASTY AND TURBINATE REDUCTION Right 12/15/2022    Procedure: FESS, WITH NASAL SEPTOPLASTY AND TURBINATE REDUCTION;  Surgeon: Kingston Rojas MD;  Location: [facility] OR;  Service: ENT;  Laterality: Right;    NEPHRECTOMY Left 11/25/2022    partial    PROBING OF NASOLACRIMAL DUCT WITH INSERTION OF TUBE Right 12/15/2022    Procedure: PROBING, NASOLACRIMAL DUCT, WITH TUBE INSERTION Right Endoscopic DCR with Hameed Stent Placement;  Surgeon: Kingston Rojsa MD;  Location: [facility] OR;  Service: ENT;  Laterality: Right;    VASECTOMY  08/01/1987     Family History   Problem Relation Age of Onset    Diabetes Mother     Bladder Cancer Mother     Cancer Mother     Heart disease Mother      Social Connections: Unknown (11/17/2023)    Social Connection and Isolation Panel [NHANES]     Frequency of Communication with Friends and  Family: Twice a week     Frequency of Social Gatherings with Friends and Family: Once a week     Attends [employer] Services: Not on file     Active Member of Clubs or Organizations: Yes     Attends Club or Organization Meetings: More than 4 times per year     Marital Status:        Review of patient's allergies indicates:   Allergen Reactions    Meperidine Nausea And Vomiting      Current Outpatient Medications on File Prior to Visit   Medication Sig Dispense Refill    blood sugar diagnostic Strp Test glucose once daily 100 each 5    cholecalciferol, vitamin D3, (VITAMIN D3 ORAL) Take 1 tablet by mouth once daily.      metFORMIN (GLUCOPHAGE-XR) 500 MG ER 24hr tablet Take 1 tablet (500 mg total) by mouth daily with breakfast. 90 tablet 1    VITAMIN K2 ORAL Take 1 tablet by mouth once daily.      fluticasone propionate (FLONASE) 50 mcg/actuation nasal spray        No current facility-administered medications on file prior to visit.      Review of Systems   Constitutional:  Negative for activity change, appetite change, chills, diaphoresis, fatigue, fever and unexpected weight change.   HENT:  Negative for nasal congestion, mouth sores, nosebleeds, sinus pressure/congestion, sore throat and trouble swallowing.    Eyes: Negative.  Negative for visual disturbance.   Respiratory:  Positive for cough (in AM). Negative for shortness of breath.    Cardiovascular:  Negative for chest pain and palpitations.   Gastrointestinal:  Negative for abdominal distention, abdominal pain, blood in stool, change in bowel habit, constipation, diarrhea, nausea and vomiting.   Endocrine: Negative.    Genitourinary:  Negative for bladder incontinence, decreased urine volume, difficulty urinating, dysuria, frequency, hematuria and urgency.   Musculoskeletal:  Negative for arthralgias, back pain, gait problem, joint swelling, leg pain and myalgias.   Integumentary:  Negative for rash.   Allergic/Immunologic: Negative.    Neurological:   "Negative for dizziness, tremors, syncope, weakness, light-headedness, numbness, headaches and memory loss.   Hematological:  Negative for adenopathy. Does not bruise/bleed easily.   Psychiatric/Behavioral:  Negative for agitation, confusion, hallucinations, sleep disturbance and suicidal ideas. The patient is not nervous/anxious.               Vitals:    02/14/24 0931   BP: (!) 156/82   BP Location: Left arm   Patient Position: Sitting   Pulse: (!) 55   Resp: 17   Temp: 97.8 °F (36.6 °C)   TempSrc: Oral   SpO2: 98%   Weight: 78 kg (172 lb)   Height: 6' 1" (1.854 m)          Wt Readings from Last 6 Encounters:   02/14/24 78 kg (172 lb)   01/22/24 78.5 kg (173 lb)   11/30/23 78.6 kg (173 lb 4.5 oz)   11/20/23 80.7 kg (178 lb)   11/09/23 80.7 kg (177 lb 14.4 oz)   10/25/23 81.4 kg (179 lb 6.4 oz)     Body mass index is 22.69 kg/m².  Body surface area is 2 meters squared.  Physical Exam  Vitals and nursing note reviewed.   Constitutional:       General: He is not in acute distress.     Appearance: Normal appearance.   HENT:      Head: Normocephalic and atraumatic.      Mouth/Throat:      Mouth: Mucous membranes are moist.   Eyes:      General: No scleral icterus.     Extraocular Movements: Extraocular movements intact.      Conjunctiva/sclera: Conjunctivae normal.   Neck:      Vascular: No JVD.   Cardiovascular:      Rate and Rhythm: Normal rate and regular rhythm.      Heart sounds: No murmur heard.  Pulmonary:      Effort: Pulmonary effort is normal.      Breath sounds: Normal breath sounds. No wheezing or rhonchi.   Abdominal:      General: Bowel sounds are normal. There is no distension.      Palpations: Abdomen is soft.      Tenderness: There is no abdominal tenderness.   Musculoskeletal:         General: No swelling or deformity.      Cervical back: Neck supple.   Lymphadenopathy:      Head:      Right side of head: No submandibular adenopathy.      Left side of head: No submandibular adenopathy.      Cervical: No " cervical adenopathy.      Upper Body:      Right upper body: No supraclavicular or axillary adenopathy.      Left upper body: No supraclavicular or axillary adenopathy.      Lower Body: No right inguinal adenopathy. No left inguinal adenopathy.   Skin:     General: Skin is warm.      Coloration: Skin is not jaundiced.      Findings: No rash.   Neurological:      General: No focal deficit present.      Mental Status: He is alert and oriented to person, place, and time.      Cranial Nerves: Cranial nerves 2-12 are intact.   Psychiatric:         Attention and Perception: Attention normal.         Behavior: Behavior is cooperative.       Laboratory:  CBC with Differential:  Lab Results   Component Value Date    WBC 3.83 (L) 02/14/2024    RBC 4.79 02/14/2024    HGB 14.1 02/14/2024    HCT 41.8 (L) 02/14/2024    MCV 87.3 02/14/2024    MCH 29.4 02/14/2024    MCHC 33.7 02/14/2024    RDW 13.0 02/14/2024     02/14/2024    MPV 9.0 02/14/2024      CMP:  Sodium   Date Value Ref Range Status   11/26/2022 137 136 - 145 mmol/L Final     Sodium Level   Date Value Ref Range Status   11/20/2023 140 136 - 145 mmol/L Final     Potassium   Date Value Ref Range Status   11/26/2022 4.1 3.5 - 5.1 mmol/L Final     Potassium Level   Date Value Ref Range Status   11/20/2023 5.1 3.5 - 5.1 mmol/L Final     Chloride   Date Value Ref Range Status   11/26/2022 103 100 - 109 mmol/L Final     Carbon Dioxide   Date Value Ref Range Status   11/20/2023 27 23 - 31 mmol/L Final   11/26/2022 26 22 - 33 mmol/L Final     Blood Urea Nitrogen   Date Value Ref Range Status   11/20/2023 26.6 (H) 8.4 - 25.7 mg/dL Final   11/26/2022 20 5 - 25 mg/dL Final     Creatinine   Date Value Ref Range Status   11/20/2023 1.51 (H) 0.73 - 1.18 mg/dL Final   11/26/2022 1.27 (H) 0.57 - 1.25 mg/dL Final     Calcium   Date Value Ref Range Status   11/26/2022 9.6 8.8 - 10.6 mg/dL Final     Calcium Level Total   Date Value Ref Range Status   11/20/2023 10.3 (H) 8.8 - 10.0  mg/dL Final     Albumin Level   Date Value Ref Range Status   11/20/2023 4.1 3.4 - 4.8 g/dL Final     Bilirubin Total   Date Value Ref Range Status   11/20/2023 0.6 <=1.5 mg/dL Final     Alkaline Phosphatase   Date Value Ref Range Status   11/20/2023 75 40 - 150 unit/L Final     Aspartate Aminotransferase   Date Value Ref Range Status   11/20/2023 25 5 - 34 unit/L Final     Alanine Aminotransferase   Date Value Ref Range Status   11/20/2023 22 0 - 55 unit/L Final     Anion Gap   Date Value Ref Range Status   11/26/2022 8 8 - 16 mmol/L Final     eGFR    Date Value Ref Range Status   11/26/2022 62 mL/min/1.73mSq Final     Comment:     In accordance with NKF-ASN Task Force recommendation, calculation based on the Chronic Kidney Disease Epidemiology Collaboration (CKD-EPI) equation without adjustment for race. eGFR adjusted for gender and age and calculated in ml/min/1.73mSquared. eGFR cannot be calculated if patient is under 18 years of age.     Reference Range:   >= 60 ml/min/1.73mSquared.     Estimated GFR-Non    Date Value Ref Range Status   09/16/2021 >60 mL/min/1.73 m2 Final         Assessment:       1. Clear cell renal cell carcinoma, left    2. Lung nodules    3. Lymphadenopathy      1) pT1a G2-3 Left RCC  ---s/p robot assisted laparoscopic partial nephrectomy on 11/25/2022   2) Mesenteric lymphadenopathy-improving  3) Splenomegaly-same    During last visit we discussed:  Etiology of LAD is uncertain with differential consideration including both low-grade lymphoproliferative disease  (ie low grade lymphoma) vs metastatic renal disease.  Patient with small pulmonary nodules in the lung bases, will do Ct chest for further evaluation, lung metastasis is not excluded.    Relatively stable LAD and now splenomegaly highly suspicious for low grade lymphoma though.  Explain that low-grade lymphoma can be followed closely and then treat if there is any indication for treatment at the time  "that include threatened end-organ function, cytopenias, bulky disease, steady or rapid progression or patient with symptoms.  Observation include labs and imaging studies.    Recent CT Chest on 11/7/23 with findings strongly suggestive of pulmonary sarcoidosis    CT AP with improvement in LAD but persistent mild splenomegaly. Will cont to monitor      Plan:         Will defer ordering of CT Chest to Dr. Cha  CT A/P w and w/o contrast prior to follow up @ [facility]  Instructed to drink plenty of fluid after CT to "flush" contrast and decreased risk of kidney damage  Continue follow ups with Dr. Cha, Dr. Mcguire and Dr. Hobson  RTC in 6 months with MD, same day labs: CBC, CMP    The patient was seen, interviewed and examined. Pertinent lab and radiology studies were reviewed.   The patient was given ample opportunity to ask questions, and to the best of my abilities, all questions answered to satisfaction; patient demonstrated understanding of what we discussed and agreeable to the plan. Pt instructed to call should develop concerning signs/symptoms or have further questions.     Kelsie Jean-Baptiste MD  Hematology/Oncology        Professional Services   I, Candice Del Toro LPN, acted solely as a scribe for and in the presence of Dr. Kelsie Jean-Baptiste, who performed these services.           "

## 2024-02-21 ENCOUNTER — OFFICE VISIT (OUTPATIENT)
Dept: ORTHOPEDICS | Facility: CLINIC | Age: 69
End: 2024-02-21
Payer: COMMERCIAL

## 2024-02-21 VITALS — WEIGHT: 172 LBS | HEIGHT: 73 IN | BODY MASS INDEX: 22.8 KG/M2

## 2024-02-21 DIAGNOSIS — M17.11 PRIMARY OSTEOARTHRITIS OF RIGHT KNEE: Primary | ICD-10-CM

## 2024-02-21 PROCEDURE — 1160F RVW MEDS BY RX/DR IN RCRD: CPT | Mod: CPTII,,,

## 2024-02-21 PROCEDURE — 3288F FALL RISK ASSESSMENT DOCD: CPT | Mod: CPTII,,,

## 2024-02-21 PROCEDURE — 3008F BODY MASS INDEX DOCD: CPT | Mod: CPTII,,,

## 2024-02-21 PROCEDURE — 99213 OFFICE O/P EST LOW 20 MIN: CPT | Mod: ,,,

## 2024-02-21 PROCEDURE — 1159F MED LIST DOCD IN RCRD: CPT | Mod: CPTII,,,

## 2024-02-21 PROCEDURE — 1101F PT FALLS ASSESS-DOCD LE1/YR: CPT | Mod: CPTII,,,

## 2024-02-21 NOTE — PROGRESS NOTES
Subjective:    CC: Follow-up of the Right Knee (R knee asp / inj 1/22/24 - pt states that his knee is not as painful. He is able to run up and down stairs. Denies any swelling. )       HPI:  Patient presents to clinic for repeat evaluation of right knee.  Status post aspiration and steroid injection on 01/22/2024.  He states that his knee is doing very well today.  He got significant relief and is able to ambulate as well as utilize stairways frequently without any difficulty.  He denies any recurrent swelling, numbness, catching or locking.  Not currently taking any pain medication.  No new complaints.    ROS: Refer to HPI for pertinent ROS. All other 12 point systems negative.    Objective:    There were no vitals filed for this visit.     Physical Exam:  The patient is well-nourished, well-developed and in no apparent distress, pleasant and cooperative. Examination of the right lower extremity compartments are soft and warm. Skin is intact. There are no signs or symptoms of DVT or infection. There is no joint effusion. There is no erythema.  Non Tender to palpation along the knee ,right knee range of motion is 0-125 degrees. The knee is stable to exam with varus and valgus stressing. Negative anterior and posterior drawer. Negative Lachman´s.  Negative Mandy's test. Patella grind is positive, Negative for apprehension. Neurovascularly intact distally.    Images:  Previous Images Reviewed and discussed with patient.    Assessment:  1. Primary osteoarthritis of right knee       Plan:  Physical exam and previous imaging findings discussed with patient.  He is done very well.  We have discussed low-impact activities, OTC anti-inflammatories, and ice when needed.  Patient will call with any problems or difficulties.    Follow up: Follow up if symptoms worsen or fail to improve.

## 2024-09-04 PROBLEM — R59.0 MEDIASTINAL ADENOPATHY: Status: ACTIVE | Noted: 2024-09-04

## 2024-09-12 ENCOUNTER — OFFICE VISIT (OUTPATIENT)
Dept: HEMATOLOGY/ONCOLOGY | Facility: CLINIC | Age: 69
End: 2024-09-12
Payer: MEDICARE

## 2024-09-12 ENCOUNTER — LAB VISIT (OUTPATIENT)
Dept: LAB | Facility: HOSPITAL | Age: 69
End: 2024-09-12
Attending: INTERNAL MEDICINE
Payer: MEDICARE

## 2024-09-12 VITALS
HEART RATE: 54 BPM | DIASTOLIC BLOOD PRESSURE: 80 MMHG | TEMPERATURE: 98 F | SYSTOLIC BLOOD PRESSURE: 157 MMHG | RESPIRATION RATE: 18 BRPM | WEIGHT: 181.19 LBS | BODY MASS INDEX: 24.01 KG/M2 | OXYGEN SATURATION: 98 % | HEIGHT: 73 IN

## 2024-09-12 DIAGNOSIS — C64.2 CLEAR CELL RENAL CELL CARCINOMA, LEFT: ICD-10-CM

## 2024-09-12 DIAGNOSIS — R91.8 LUNG NODULES: ICD-10-CM

## 2024-09-12 DIAGNOSIS — Z90.5 S/P NEPHRECTOMY: ICD-10-CM

## 2024-09-12 DIAGNOSIS — C64.2 CLEAR CELL RENAL CELL CARCINOMA, LEFT: Primary | ICD-10-CM

## 2024-09-12 DIAGNOSIS — R59.1 LYMPHADENOPATHY: ICD-10-CM

## 2024-09-12 LAB
ALBUMIN SERPL-MCNC: 4.1 G/DL (ref 3.4–4.8)
ALBUMIN/GLOB SERPL: 1.3 RATIO (ref 1.1–2)
ALP SERPL-CCNC: 70 UNIT/L (ref 40–150)
ALT SERPL-CCNC: 20 UNIT/L (ref 0–55)
ANION GAP SERPL CALC-SCNC: 6 MEQ/L
AST SERPL-CCNC: 22 UNIT/L (ref 5–34)
BASOPHILS # BLD AUTO: 0.03 X10(3)/MCL
BASOPHILS NFR BLD AUTO: 0.5 %
BILIRUB SERPL-MCNC: 0.6 MG/DL
BUN SERPL-MCNC: 26 MG/DL (ref 8.4–25.7)
CALCIUM SERPL-MCNC: 10.7 MG/DL (ref 8.8–10)
CHLORIDE SERPL-SCNC: 106 MMOL/L (ref 98–107)
CO2 SERPL-SCNC: 28 MMOL/L (ref 23–31)
CREAT SERPL-MCNC: 1.43 MG/DL (ref 0.73–1.18)
CREAT/UREA NIT SERPL: 18
EOSINOPHIL # BLD AUTO: 0.17 X10(3)/MCL (ref 0–0.9)
EOSINOPHIL NFR BLD AUTO: 2.8 %
ERYTHROCYTE [DISTWIDTH] IN BLOOD BY AUTOMATED COUNT: 12.7 % (ref 11.5–17)
GFR SERPLBLD CREATININE-BSD FMLA CKD-EPI: 53 ML/MIN/1.73/M2
GLOBULIN SER-MCNC: 3.2 GM/DL (ref 2.4–3.5)
GLUCOSE SERPL-MCNC: 134 MG/DL (ref 82–115)
HCT VFR BLD AUTO: 43.2 % (ref 42–52)
HGB BLD-MCNC: 15.1 G/DL (ref 14–18)
IMM GRANULOCYTES # BLD AUTO: 0.01 X10(3)/MCL (ref 0–0.04)
IMM GRANULOCYTES NFR BLD AUTO: 0.2 %
LYMPHOCYTES # BLD AUTO: 1.03 X10(3)/MCL (ref 0.6–4.6)
LYMPHOCYTES NFR BLD AUTO: 17.1 %
MCH RBC QN AUTO: 29.9 PG (ref 27–31)
MCHC RBC AUTO-ENTMCNC: 35 G/DL (ref 33–36)
MCV RBC AUTO: 85.5 FL (ref 80–94)
MONOCYTES # BLD AUTO: 0.49 X10(3)/MCL (ref 0.1–1.3)
MONOCYTES NFR BLD AUTO: 8.1 %
NEUTROPHILS # BLD AUTO: 4.31 X10(3)/MCL (ref 2.1–9.2)
NEUTROPHILS NFR BLD AUTO: 71.3 %
PLATELET # BLD AUTO: 211 X10(3)/MCL (ref 130–400)
PMV BLD AUTO: 8.5 FL (ref 7.4–10.4)
POTASSIUM SERPL-SCNC: 4.9 MMOL/L (ref 3.5–5.1)
PROT SERPL-MCNC: 7.3 GM/DL (ref 5.8–7.6)
RBC # BLD AUTO: 5.05 X10(6)/MCL (ref 4.7–6.1)
SODIUM SERPL-SCNC: 140 MMOL/L (ref 136–145)
WBC # BLD AUTO: 6.04 X10(3)/MCL (ref 4.5–11.5)

## 2024-09-12 PROCEDURE — 80053 COMPREHEN METABOLIC PANEL: CPT

## 2024-09-12 PROCEDURE — 3079F DIAST BP 80-89 MM HG: CPT | Mod: CPTII,S$GLB,, | Performed by: INTERNAL MEDICINE

## 2024-09-12 PROCEDURE — 36415 COLL VENOUS BLD VENIPUNCTURE: CPT

## 2024-09-12 PROCEDURE — 3044F HG A1C LEVEL LT 7.0%: CPT | Mod: CPTII,S$GLB,, | Performed by: INTERNAL MEDICINE

## 2024-09-12 PROCEDURE — 1159F MED LIST DOCD IN RCRD: CPT | Mod: CPTII,S$GLB,, | Performed by: INTERNAL MEDICINE

## 2024-09-12 PROCEDURE — 99999 PR PBB SHADOW E&M-EST. PATIENT-LVL IV: CPT | Mod: PBBFAC,,, | Performed by: INTERNAL MEDICINE

## 2024-09-12 PROCEDURE — 85025 COMPLETE CBC W/AUTO DIFF WBC: CPT

## 2024-09-12 PROCEDURE — 3077F SYST BP >= 140 MM HG: CPT | Mod: CPTII,S$GLB,, | Performed by: INTERNAL MEDICINE

## 2024-09-12 PROCEDURE — 1160F RVW MEDS BY RX/DR IN RCRD: CPT | Mod: CPTII,S$GLB,, | Performed by: INTERNAL MEDICINE

## 2024-09-12 PROCEDURE — 1101F PT FALLS ASSESS-DOCD LE1/YR: CPT | Mod: CPTII,S$GLB,, | Performed by: INTERNAL MEDICINE

## 2024-09-12 PROCEDURE — 3008F BODY MASS INDEX DOCD: CPT | Mod: CPTII,S$GLB,, | Performed by: INTERNAL MEDICINE

## 2024-09-12 PROCEDURE — 3288F FALL RISK ASSESSMENT DOCD: CPT | Mod: CPTII,S$GLB,, | Performed by: INTERNAL MEDICINE

## 2024-09-12 PROCEDURE — 99214 OFFICE O/P EST MOD 30 MIN: CPT | Mod: S$GLB,,, | Performed by: INTERNAL MEDICINE

## 2024-09-12 PROCEDURE — 1126F AMNT PAIN NOTED NONE PRSNT: CPT | Mod: CPTII,S$GLB,, | Performed by: INTERNAL MEDICINE

## 2024-09-12 NOTE — PROGRESS NOTES
HEMATOLOGY/ONCOLOGY OFFICE CLINIC VISIT    Visit Information:    Initial Evaluation: 10/25/2023  Referring Provider:  Dr Anderson Hobson  Other providers: Dr Stephane Martines (PCP)  Code status: Not addressed    Diagnosis:  pT1a G2-3 Left RCC  Lymphadenopathy    Present treatment:  Observation    Treatment/Oncology history:  11/25/2022 Left robot assisted laparoscopic partial nephrectomy       Plan of care:     Imaging:  CT AP 1/20/2014: There is a nonspecific left para-aortic enlarged lymph node measuring 1.4 cm x 1.0 cm. The liver, spleen, pancreas and aorta  are within normal limits on this unenhanced study Prominent obstructing proximal right ureteral stone.  CT AP 10/25/2022:  Small pleural-based nodules in both lower lobes measuring 4 mm or less and appears stable compared to prior exam from 01/20/2014.  Partially exophytic enhancing mass in the posteromedial left kidney upper lobe measuring 2.2 x 3.0 x 2.7 cm.  No invasion of the renal sinus fat.  Spleen normal.  Multiple enlarged mesenteric and retroperitoneal lymph nodes, similar to the prior exam.  Reference mesenteric nodes measuring 1.2 cm, previously 2.6 cm.  No aggressive osseous lesions.  CT AP 06/13/2023 outside facility:  7 mm pleural-based nodule present in the posterior right lower lobe.  Few small nodules present in the left lower lobe measuring up to 6 mm.  An abnormal enlarged paraesophageal lymph node measured 2.8 cm.  No liver lesion.  Spleen is mildly enlarged measuring 13 cm.  Partial nephrectomy defect present at the left renal dorsal cortex.  Multiple small lymph node present at the gastroesophageal junction.  Enlarged lymph node present in the small bowel mesentery with a representative lymph node measuring 2.9 x 1.8 cm.  A 2nd representative lymph node measured 2.7 x 2.3 cm.  Multiple mildly enlarged retroperitoneum lymph nodes.  Representative infrarenal left periaortic lymph node measuring 1 cm.  Impression: Abnormal enlarged periesophageal  lymph nodes.  Mild splenomegaly.  Multiple enlarged lymph nodes in the small bowel mesentery.  Mildly enlarged retroperitoneal lymph node.  Etiology uncertain with differential consideration including both low-grade lymphoproliferative disease and metastatic renal disease.  Small pulmonary nodules in the lung bases, metastasis is not excluded.  CT AP 2023 outside facility: Small nodules seen in lung bases, unchanged.  Spleen is enlarged measuring 13.9 cm.  No significant hepatic steatosis.  No liver lesion seen.  Partial nephrectomy defect is present at the dorsal cortex of the left renal upper pole.  5 mm nonobstructive stone in the left renal lower pole.  Enlarged lymph node with faint calcifications noted surrounding the distal esophagus.  Multiple abnormal enlarged nodes present in the small bowel mesentery, also unchanged.  Retroperitoneal lymph nodes are increased in number but small in size, stable.  Impression:  Stable partial nephrectomy defect in the left kidney.  Enlarged periesophageal lymph node, splenomegaly, and enlarged lymph node in the small bowel mesentery unchanged.  ? CLL  CT Chest 2023 @ [facility]. Constellation of findings strongly suggestive of pulmonary sarcoidosis with fairly extensive perilymphatic distribution nodules within the perihilar aspects of the right and left lung along with mediastinal, hilar, and subcarinal adenopathy. Pulmonary consultation and follow-up is recommended.  2. No acute airspace consolidation or pulmonary fibrosis. No other lymphadenopathy within the chest seen.  CT A/P 2024 @ [facility]:  1. Interval left wedge partial nephrectomy. Small benign-appearing left upper pole renal cyst has shown size decrease with few stable benign (Bosniak class I) renal cysts. No developing or enlarging focal renal lesion. No evidence to suggest metastatic disease identified.  2. Stable non-obstructing left lower pole renal stone measuring about 4 mm. No  ureteral calculus or obstructive uropathy.  3. Scattered enlarged mesenteric lymph nodes appear similar in number but mildly decreased in size. No enlarging retroperitoneal or mesenteric lymph nodes identified. Lower paraesophageal posterior mediastinal lymph nodes noted within the included lower thorax appears similar. Borderline splenomegaly also noted. Adenopathy related to sarcoidosis. The most likely.  4. Stable thickened appearance of the appendix without evidence for acute appendicitis. Minimal non-inflamed colonic diverticulosis also noted. No focal inflammatory process identified.   5. Right indirect type inguinal hernia with knuckle of small bowel extending into the right deep inguinal ring. No bowel obstruction.  6. Stable mild prostatomegaly.   CT C/A/P 8/29/2024 @ [facility]:  Perilymphatic nodules within the perihilar regions of both lungs along with mediastinal, hilar, and subcarinal lymphadenopathy appears similar. No focal enlarging nodules, acute airspace disease, or pulmonary fibrosis. Nodule burden within the chest appears stable.  Essentially stable mesenteric lymphadenopathy  Prior left wedge partial nephrectomy with stable benign renal cysts. No substantially enlarging or suspicious renal lesion.  Interval displacement of stone within the lower pole the left kidney from the prior exam, now located within proximal to mid ureter at the L3-L4 interspace leve. No upstream hydroureteronephrosis. Consider urology follow up as needed.  Stable borderline splenomegaly.  Other stable ancillary findings described above. No acute intra-abdominal or pelvic abnormality identified. No new finding of significance identified.    Pathology:  11/25/2022:  Left kidney, partial nephrectomy:  Clear cell carcinoma, grade 2-3, measuring 2.4 cm.  Tumor permeated is the capsule but does not extend through it into perirenal soft tissues.  Margins free of tumor.  Sarcomatoid features absent.  Rhabdoid features absent.   Tumor necrosis absent.  Lymphovascular invasion negative.  pT1a G2-3    CLINICAL HISTORY:       Patient: Santiago Sutton is a very pleasant 69 y.o. male kindly referred for lymphadenopathy.     Patient's lymphadenopathy has been mentioned on CT's AP at least since January 20, 2014. At that time LN were described as nonspecific left para-aortic lymph nodes.  CT scan done in Oct 2022, June and September 2023 mentioned with mesenteric and retroperitoneal lymphadenopathy and splenomegaly is described in the scans of this year only.  CT abdomen pelvis 09/21/2023 with unchanged mesenteric lymph nodes, retroperitoneal lymph node increased in number but small in size and stable.  Enlarged periesophageal lymph node again noted.  Splenomegaly has been present in June and September 2023 scans but not in 2014.    Patient with h/o left RCC s/p Left robot assisted laparoscopic partial nephrectomy on 11/25/2022. He has recovered well from surgery.    Patient is here today with his wife.  He feels good and voices no concerns.  No fever, chills, sweats.  No chest pain or short of breath.  No unintentional weight loss.      Chief Complaint: 6 Month Follow Up      Interval History:    Patient presents today for follow up to discuss CT scan results, accompanied by his wife. He is doing well.  No fever, chills, sweats.  No chest pain or short of breath. He continues to follow Dr. Cha and Dr. Hobson.  CT scan results and recommendations discussed with them. All questions answered.      Past Medical History:   Diagnosis Date    Acute dacryocystitis     Cough     DM (diabetes mellitus)     Kidney stone     Reactive lymphadenopathy     Renal malignant tumor       Past Surgical History:   Procedure Laterality Date    COLONOSCOPY      CYSTOSCOPY W/ LASER LITHOTRIPSY      Dayton Ribeiro MD    ENDOBRONCHIAL ULTRASOUND N/A 11/30/2023    Procedure: ENDOBRONCHIAL ULTRASOUND (EBUS);  Surgeon: Joao Eid MD;  Location: [facility]ONCHOSCOPY  LAB;  Service: Pulmonary;  Laterality: N/A;    FESS, WITH NASAL SEPTOPLASTY AND TURBINATE REDUCTION Right 12/15/2022    Procedure: FESS, WITH NASAL SEPTOPLASTY AND TURBINATE REDUCTION;  Surgeon: Kingston Rojas MD;  Location: [facility] OR;  Service: ENT;  Laterality: Right;    NEPHRECTOMY Left 11/25/2022    partial    PROBING OF NASOLACRIMAL DUCT WITH INSERTION OF TUBE Right 12/15/2022    Procedure: PROBING, NASOLACRIMAL DUCT, WITH TUBE INSERTION Right Endoscopic DCR with Hameed Stent Placement;  Surgeon: Kingston Rojas MD;  Location: [facility] OR;  Service: ENT;  Laterality: Right;    VASECTOMY  08/01/1987     Family History   Problem Relation Name Age of Onset    Diabetes Mother Siena Sutton     Bladder Cancer Mother Siena Sutton     Cancer Mother Siena Sutton     Heart disease Mother Siena Sutton             Review of patient's allergies indicates:   Allergen Reactions    Meperidine Nausea And Vomiting      Current Outpatient Medications on File Prior to Visit   Medication Sig Dispense Refill    blood sugar diagnostic Strp Test glucose once daily 100 each 5    cholecalciferol, vitamin D3, (VITAMIN D3 ORAL) Take 1 tablet by mouth once daily.      metFORMIN (GLUCOPHAGE-XR) 500 MG ER 24hr tablet Take 1 tablet (500 mg total) by mouth daily with breakfast. 90 tablet 1    VITAMIN K2 ORAL Take 1 tablet by mouth once daily.       No current facility-administered medications on file prior to visit.      Review of Systems   Constitutional:  Negative for activity change, appetite change, chills, diaphoresis, fatigue, fever and unexpected weight change.   HENT:  Negative for nasal congestion, mouth sores, nosebleeds, sinus pressure/congestion, sore throat and trouble swallowing.    Eyes: Negative.  Negative for visual disturbance.   Respiratory:  Negative for cough and shortness of breath.    Cardiovascular:  Negative for chest pain and palpitations.   Gastrointestinal:  Negative for abdominal distention, abdominal pain,  "blood in stool, change in bowel habit, constipation, diarrhea, nausea and vomiting.   Endocrine: Negative.    Genitourinary:  Negative for bladder incontinence, decreased urine volume, difficulty urinating, dysuria, frequency, hematuria and urgency.   Musculoskeletal:  Negative for arthralgias, back pain, gait problem, joint swelling, leg pain and myalgias.   Integumentary:  Negative for rash.   Allergic/Immunologic: Negative.    Neurological:  Negative for dizziness, tremors, syncope, weakness, light-headedness, numbness, headaches and memory loss.   Hematological:  Negative for adenopathy. Does not bruise/bleed easily.   Psychiatric/Behavioral:  Negative for agitation, confusion, hallucinations, sleep disturbance and suicidal ideas. The patient is not nervous/anxious.               Vitals:    09/12/24 1000   BP: (!) 157/80   BP Location: Left arm   Patient Position: Sitting   Pulse: (!) 54   Resp: 18   Temp: 97.7 °F (36.5 °C)   TempSrc: Oral   SpO2: 98%   Weight: 82.2 kg (181 lb 3.2 oz)   Height: 6' 1" (1.854 m)            Wt Readings from Last 6 Encounters:   09/12/24 82.2 kg (181 lb 3.2 oz)   09/04/24 80.3 kg (177 lb)   04/05/24 79.8 kg (176 lb)   02/21/24 81.2 kg (179 lb)   02/21/24 78 kg (172 lb)   02/14/24 78 kg (172 lb)     Body mass index is 23.91 kg/m².  Body surface area is 2.06 meters squared.  Physical Exam  Vitals and nursing note reviewed.   Constitutional:       General: He is not in acute distress.     Appearance: Normal appearance.   HENT:      Head: Normocephalic and atraumatic.      Mouth/Throat:      Mouth: Mucous membranes are moist.   Eyes:      General: No scleral icterus.     Extraocular Movements: Extraocular movements intact.      Conjunctiva/sclera: Conjunctivae normal.   Neck:      Vascular: No JVD.   Cardiovascular:      Rate and Rhythm: Normal rate and regular rhythm.      Heart sounds: No murmur heard.  Pulmonary:      Effort: Pulmonary effort is normal.      Breath sounds: Normal " breath sounds. No wheezing or rhonchi.   Abdominal:      General: Bowel sounds are normal. There is no distension.      Palpations: Abdomen is soft.      Tenderness: There is no abdominal tenderness.   Musculoskeletal:         General: No swelling or deformity.      Cervical back: Neck supple.   Lymphadenopathy:      Head:      Right side of head: No submental or submandibular adenopathy.      Left side of head: Submental adenopathy present. No submandibular adenopathy.      Cervical: No cervical adenopathy.      Upper Body:      Right upper body: No supraclavicular or axillary adenopathy.      Left upper body: No supraclavicular or axillary adenopathy.      Lower Body: No right inguinal adenopathy. No left inguinal adenopathy.   Skin:     General: Skin is warm.      Coloration: Skin is not jaundiced.      Findings: No rash.   Neurological:      General: No focal deficit present.      Mental Status: He is alert and oriented to person, place, and time.      Cranial Nerves: Cranial nerves 2-12 are intact.   Psychiatric:         Attention and Perception: Attention normal.         Behavior: Behavior is cooperative.       Laboratory:  CBC with Differential:  Lab Results   Component Value Date    WBC 6.04 09/12/2024    RBC 5.05 09/12/2024    HGB 15.1 09/12/2024    HCT 43.2 09/12/2024    MCV 85.5 09/12/2024    MCH 29.9 09/12/2024    MCHC 35.0 09/12/2024    RDW 12.7 09/12/2024     09/12/2024    MPV 8.5 09/12/2024      CMP:  Sodium   Date Value Ref Range Status   04/05/2024 138 136 - 145 mmol/L Final   11/26/2022 137 136 - 145 mmol/L Final     Potassium   Date Value Ref Range Status   04/05/2024 5.1 3.5 - 5.1 mmol/L Final   11/26/2022 4.1 3.5 - 5.1 mmol/L Final     Chloride   Date Value Ref Range Status   04/05/2024 102 98 - 107 mmol/L Final   11/26/2022 103 100 - 109 mmol/L Final     CO2   Date Value Ref Range Status   04/05/2024 31 21 - 32 mmol/L Final     Carbon Dioxide   Date Value Ref Range Status   11/26/2022 26  22 - 33 mmol/L Final     Blood Urea Nitrogen   Date Value Ref Range Status   04/05/2024 18.0 7.0 - 18.0 mg/dL Final   11/26/2022 20 5 - 25 mg/dL Final     Creatinine   Date Value Ref Range Status   04/05/2024 1.28 0.60 - 1.30 mg/dL Final   11/26/2022 1.27 (H) 0.57 - 1.25 mg/dL Final     Calcium   Date Value Ref Range Status   04/05/2024 10.7 (H) 8.5 - 10.1 mg/dL Final   11/26/2022 9.6 8.8 - 10.6 mg/dL Final     Albumin   Date Value Ref Range Status   02/14/2024 4.0 3.4 - 4.8 g/dL Final     Bilirubin Total   Date Value Ref Range Status   02/14/2024 0.4 <=1.5 mg/dL Final     ALP   Date Value Ref Range Status   02/14/2024 68 40 - 150 unit/L Final     AST   Date Value Ref Range Status   02/14/2024 24 5 - 34 unit/L Final     ALT   Date Value Ref Range Status   02/14/2024 21 0 - 55 unit/L Final     Anion Gap   Date Value Ref Range Status   11/26/2022 8 8 - 16 mmol/L Final     eGFR    Date Value Ref Range Status   11/26/2022 62 mL/min/1.73mSq Final     Comment:     In accordance with NKF-ASN Task Force recommendation, calculation based on the Chronic Kidney Disease Epidemiology Collaboration (CKD-EPI) equation without adjustment for race. eGFR adjusted for gender and age and calculated in ml/min/1.73mSquared. eGFR cannot be calculated if patient is under 18 years of age.     Reference Range:   >= 60 ml/min/1.73mSquared.     Estimated GFR-Non    Date Value Ref Range Status   09/16/2021 >60 mL/min/1.73 m2 Final         Assessment:       1) pT1a G2-3 Left RCC  ---s/p robot assisted laparoscopic partial nephrectomy on 11/25/2022   2) Mesenteric lymphadenopathy-improving  3) Splenomegaly-same    During last visit we discussed:  Etiology of LAD is uncertain with differential consideration including both low-grade lymphoproliferative disease  (ie low grade lymphoma) vs metastatic renal disease.  Patient with small pulmonary nodules in the lung bases, will do Ct chest for further evaluation, lung  "metastasis is not excluded.    Relatively stable LAD and now splenomegaly highly suspicious for low grade lymphoma though.  Explain that low-grade lymphoma can be followed closely and then treat if there is any indication for treatment at the time that include threatened end-organ function, cytopenias, bulky disease, steady or rapid progression or patient with symptoms.  Observation include labs and imaging studies.    Recent CT Chest on 11/7/23 with findings strongly suggestive of pulmonary sarcoidosis    CT AP with improvement in LAD but persistent mild splenomegaly. Will cont to monitor      Plan:       Will defer ordering of CT Chest to Dr. Cha  CT A/P w and w/o contrast prior to follow up @ [facility]  Instructed to drink plenty of fluid after CT to "flush" contrast and decreased risk of kidney damage  Continue follow ups with Dr. Cha, Dr. Mcguire and Dr. Hobson  RTC in 6 months with MD, same day labs: CBC, CMP    The patient was seen, interviewed and examined. Pertinent lab and radiology studies were reviewed.   The patient was given ample opportunity to ask questions, and to the best of my abilities, all questions answered to satisfaction; patient demonstrated understanding of what we discussed and agreeable to the plan. Pt instructed to call should develop concerning signs/symptoms or have further questions.     Kelsie Jean-Baptiste MD  Hematology/Oncology        Professional Services   I, Candice Del Toro LPN, acted solely as a scribe for and in the presence of Dr. Kelsie Jean-Baptiste, who performed these services.             "

## 2024-10-10 PROBLEM — Z87.442 HISTORY OF KIDNEY STONES: Status: ACTIVE | Noted: 2022-09-28

## 2024-10-10 PROBLEM — E78.2 MIXED HYPERLIPIDEMIA: Status: ACTIVE | Noted: 2023-10-05

## 2024-10-10 PROBLEM — Z87.442 HISTORY OF KIDNEY STONES: Status: RESOLVED | Noted: 2022-09-28 | Resolved: 2024-10-10

## 2024-10-22 ENCOUNTER — PATIENT MESSAGE (OUTPATIENT)
Dept: RESEARCH | Facility: HOSPITAL | Age: 69
End: 2024-10-22
Payer: MEDICARE

## 2025-03-06 ENCOUNTER — TELEPHONE (OUTPATIENT)
Dept: HEMATOLOGY/ONCOLOGY | Facility: CLINIC | Age: 70
End: 2025-03-06
Payer: MEDICARE

## 2025-03-06 NOTE — TELEPHONE ENCOUNTER
Patient left message wanting to know status of CT scan scheduling. Voice message forwarded to radiology scheduling team.

## 2025-03-18 NOTE — PROGRESS NOTES
HEMATOLOGY/ONCOLOGY OFFICE CLINIC VISIT    Visit Information:    Initial Evaluation: 10/25/2023  Referring Provider:  Dr Anderson Hobson  Other providers: Dr Stephane Martines (PCP)  Code status: Not addressed    Diagnosis:  pT1a Stage I G2-3 Left RCC  Lymphadenopathy    Present treatment:  Observation    Treatment/Oncology history:  11/25/2022 Left robot assisted laparoscopic partial nephrectomy       Imaging:  CT AP 1/20/2014: There is a nonspecific left para-aortic enlarged lymph node measuring 1.4 cm x 1.0 cm. The liver, spleen, pancreas and aorta  are within normal limits on this unenhanced study Prominent obstructing proximal right ureteral stone.  CT AP 10/25/2022:  Small pleural-based nodules in both lower lobes measuring 4 mm or less and appears stable compared to prior exam from 01/20/2014.  Partially exophytic enhancing mass in the posteromedial left kidney upper lobe measuring 2.2 x 3.0 x 2.7 cm.  No invasion of the renal sinus fat.  Spleen normal.  Multiple enlarged mesenteric and retroperitoneal lymph nodes, similar to the prior exam.  Reference mesenteric nodes measuring 1.2 cm, previously 2.6 cm.  No aggressive osseous lesions.  CT AP 06/13/2023 outside facility:  7 mm pleural-based nodule present in the posterior right lower lobe.  Few small nodules present in the left lower lobe measuring up to 6 mm.  An abnormal enlarged paraesophageal lymph node measured 2.8 cm.  No liver lesion.  Spleen is mildly enlarged measuring 13 cm.  Partial nephrectomy defect present at the left renal dorsal cortex.  Multiple small lymph node present at the gastroesophageal junction.  Enlarged lymph node present in the small bowel mesentery with a representative lymph node measuring 2.9 x 1.8 cm.  A 2nd representative lymph node measured 2.7 x 2.3 cm.  Multiple mildly enlarged retroperitoneum lymph nodes.  Representative infrarenal left periaortic lymph node measuring 1 cm.  Impression: Abnormal enlarged periesophageal lymph  nodes.  Mild splenomegaly.  Multiple enlarged lymph nodes in the small bowel mesentery.  Mildly enlarged retroperitoneal lymph node.  Etiology uncertain with differential consideration including both low-grade lymphoproliferative disease and metastatic renal disease.  Small pulmonary nodules in the lung bases, metastasis is not excluded.  CT AP 2023 outside facility: Small nodules seen in lung bases, unchanged.  Spleen is enlarged measuring 13.9 cm.  No significant hepatic steatosis.  No liver lesion seen.  Partial nephrectomy defect is present at the dorsal cortex of the left renal upper pole.  5 mm nonobstructive stone in the left renal lower pole.  Enlarged lymph node with faint calcifications noted surrounding the distal esophagus.  Multiple abnormal enlarged nodes present in the small bowel mesentery, also unchanged.  Retroperitoneal lymph nodes are increased in number but small in size, stable.  Impression:  Stable partial nephrectomy defect in the left kidney.  Enlarged periesophageal lymph node, splenomegaly, and enlarged lymph node in the small bowel mesentery unchanged.  ? CLL  CT Chest 2023 @ [facility]. Constellation of findings strongly suggestive of pulmonary sarcoidosis with fairly extensive perilymphatic distribution nodules within the perihilar aspects of the right and left lung along with mediastinal, hilar, and subcarinal adenopathy. Pulmonary consultation and follow-up is recommended.  2. No acute airspace consolidation or pulmonary fibrosis. No other lymphadenopathy within the chest seen.  CT A/P 2024 @ [facility]:  1. Interval left wedge partial nephrectomy. Small benign-appearing left upper pole renal cyst has shown size decrease with few stable benign (Bosniak class I) renal cysts. No developing or enlarging focal renal lesion. No evidence to suggest metastatic disease identified.  2. Stable non-obstructing left lower pole renal stone measuring about 4 mm. No ureteral  calculus or obstructive uropathy.  3. Scattered enlarged mesenteric lymph nodes appear similar in number but mildly decreased in size. No enlarging retroperitoneal or mesenteric lymph nodes identified. Lower paraesophageal posterior mediastinal lymph nodes noted within the included lower thorax appears similar. Borderline splenomegaly also noted. Adenopathy related to sarcoidosis. The most likely.  4. Stable thickened appearance of the appendix without evidence for acute appendicitis. Minimal non-inflamed colonic diverticulosis also noted. No focal inflammatory process identified.   5. Right indirect type inguinal hernia with knuckle of small bowel extending into the right deep inguinal ring. No bowel obstruction.  6. Stable mild prostatomegaly.   CT C/A/P 8/29/2024 @ [facility]:  Perilymphatic nodules within the perihilar regions of both lungs along with mediastinal, hilar, and subcarinal lymphadenopathy appears similar. No focal enlarging nodules, acute airspace disease, or pulmonary fibrosis. Nodule burden within the chest appears stable.  Essentially stable mesenteric lymphadenopathy  Prior left wedge partial nephrectomy with stable benign renal cysts. No substantially enlarging or suspicious renal lesion.  Interval displacement of stone within the lower pole the left kidney from the prior exam, now located within proximal to mid ureter at the L3-L4 interspace leve. No upstream hydroureteronephrosis. Consider urology follow up as needed.  Stable borderline splenomegaly.  Other stable ancillary findings described above. No acute intra-abdominal or pelvic abnormality identified. No new finding of significance identified.  CT C/A/P 03/13/2025 @ [facility]: 1. Stable appearing chest with perihilar perilymphatic nodularity of both lungs with mediastinal, hilar, and subcarinal lymphadenopathy. Constellation of findings suggestive of sarcoidosis. No enlarging pulmonary nodule, acute airspace disease, or pulmonary  fibrosis. Lisa burden within the chest appears stable.  2. Stable mesenteric lymphadenopathy and borderline splenomegaly. No developing or enlarging abdominopelvic renal lesion. No evidence to suggest disease progression.  3. Previous left partial nephrectomy with small stable benign left renal cysts. No developing, enlarging, or suspicious renal lesion. No evidence to suggest disease progression.  4. Persistent 4mm left ureteral calculus, now located at  the left UVJ. No hydroureteronephrosis. Urology consultation and follow up is recommended if not already established.    Pathology:  11/25/2022:    Left kidney, partial nephrectomy:  Clear cell carcinoma, grade 2-3, measuring 2.4 cm.  Tumor permeated is the capsule but does not extend through it into perirenal soft tissues.  Margins free of tumor.  Sarcomatoid features absent.  Rhabdoid features absent.  Tumor necrosis absent.  Lymphovascular invasion negative.  pT1a G2-3    12/15/2022:  1.  RIGHT ANTERIOR ETHMOID, SEPTUM AND TURBINATES:   A) CHRONIC SINUSITIS.   B) HYALINE CARTILAGE CONSISTENT WITH NASAL SEPTOPLASTY.   C) CHRONIC RHINITIS.   2.  RIGHT POSTERIOR ETHMOID AND SPHENOID, EXCISION: MARKED CHRONIC (GRANULOMATOUS) SINUSITIS.  3.  RIGHT MAXILLARY SINUS, EXCISION: CHRONIC SINUSITIS.   4.  RIGHT FRONTAL SINUS, EXCISION: CHRONIC SINUSITIS.    SPECIAL STAINS:  PAS, GMS: NEGATIVE FOR FUNGAL MICROORGANISMS  TUSHAR: NEGATIVE FOR ACID FAST MICROORGANISMS    11/30/2023:  LYMPH NODE, 10L, FINE NEEDLE ASPIRATION CYTOLOGY: NON-NECROTIZING GRANULOMATOUS LYMPHADENITIS  Note: Stains for fungal and acid fast organisms are negative. The differential diagnosis broad and includes hypersensitivity, infection, collagen vascular   disease, among others, though sarcoidosis should be clinically excluded.      CLINICAL HISTORY:       Patient: Santiago Sutton is a very pleasant 69 y.o. male kindly referred for lymphadenopathy.     Patient's lymphadenopathy has been mentioned on CT's  AP at least since January 20, 2014. At that time LN were described as nonspecific left para-aortic lymph nodes.  CT scan done in Oct 2022, June and September 2023 mentioned with mesenteric and retroperitoneal lymphadenopathy and splenomegaly is described in the scans of this year only.  CT abdomen pelvis 09/21/2023 with unchanged mesenteric lymph nodes, retroperitoneal lymph node increased in number but small in size and stable.  Enlarged periesophageal lymph node again noted.  Splenomegaly has been present in June and September 2023 scans but not in 2014.    Patient with h/o left RCC s/p Left robot assisted laparoscopic partial nephrectomy on 11/25/2022. He has recovered well from surgery.    Patient is here today with his wife.  He feels good and voices no concerns.  No fever, chills, sweats.  No chest pain or short of breath.  No unintentional weight loss.      Chief Complaint: 6 Month Follow Up      Interval History:    03/20/25: Patient presents today for follow up to discuss CT scan results. Pt states he is doing well. Denies fever, chills, or sweats. No chest pain or   Shortness of breath. CT scan results reviewed with pt. Continues follow ups with Dr. Cha and Dr. Hobson.   LAD stable    Past Medical History:   Diagnosis Date    Acute dacryocystitis     Cough     DM (diabetes mellitus)     Kidney stone     Reactive lymphadenopathy     Renal malignant tumor       Past Surgical History:   Procedure Laterality Date    COLONOSCOPY      CYSTOSCOPY W/ LASER LITHOTRIPSY      Dayton Ribeiro MD    ENDOBRONCHIAL ULTRASOUND N/A 11/30/2023    Procedure: ENDOBRONCHIAL ULTRASOUND (EBUS);  Surgeon: Joao Eid MD;  Location: [facility];  Service: Pulmonary;  Laterality: N/A;    FESS, WITH NASAL SEPTOPLASTY AND TURBINATE REDUCTION Right 12/15/2022    Procedure: FESS, WITH NASAL SEPTOPLASTY AND TURBINATE REDUCTION;  Surgeon: Kingston Rjoas MD;  Location: [facility] OR;  Service: ENT;  Laterality: Right;     NEPHRECTOMY Left 11/25/2022    partial    PROBING OF NASOLACRIMAL DUCT WITH INSERTION OF TUBE Right 12/15/2022    Procedure: PROBING, NASOLACRIMAL DUCT, WITH TUBE INSERTION Right Endoscopic DCR with Hameed Stent Placement;  Surgeon: Kingston Rojas MD;  Location: [facility];  Service: ENT;  Laterality: Right;    VASECTOMY  08/01/1987     Family History   Problem Relation Name Age of Onset    Diabetes Mother Siena Sutton     Bladder Cancer Mother Siena Sutton     Cancer Mother Siena Sutton     Heart disease Mother Siena Sutton             Review of patient's allergies indicates:   Allergen Reactions    Meperidine Nausea And Vomiting      Current Outpatient Medications on File Prior to Visit   Medication Sig Dispense Refill    blood sugar diagnostic Strp Test glucose once daily 100 each 5    cholecalciferol, vitamin D3, (VITAMIN D3 ORAL) Take 1 tablet by mouth once daily.      lancets 30 gauge Misc 1 lancet  by Misc.(Non-Drug; Combo Route) route once daily. To fit Contour Next 100 each 5    metFORMIN (GLUCOPHAGE-XR) 500 MG ER 24hr tablet Take 1 tablet (500 mg total) by mouth daily with breakfast. 90 tablet 1    VITAMIN K2 ORAL Take 1 tablet by mouth once daily.       No current facility-administered medications on file prior to visit.      Review of Systems   Constitutional:  Negative for activity change, appetite change, chills, diaphoresis, fatigue, fever and unexpected weight change.   HENT:  Negative for nasal congestion, mouth sores, nosebleeds, sinus pressure/congestion, sore throat and trouble swallowing.    Eyes: Negative.  Negative for visual disturbance.   Respiratory:  Negative for cough and shortness of breath.    Cardiovascular:  Negative for chest pain and palpitations.   Gastrointestinal:  Negative for abdominal distention, abdominal pain, blood in stool, change in bowel habit, constipation, diarrhea, nausea and vomiting.   Endocrine: Negative.    Genitourinary:  Negative for bladder incontinence,  "decreased urine volume, difficulty urinating, dysuria, frequency, hematuria and urgency.   Musculoskeletal:  Negative for arthralgias, back pain, gait problem, joint swelling, leg pain and myalgias.   Integumentary:  Negative for rash.   Allergic/Immunologic: Negative.    Neurological:  Negative for dizziness, tremors, syncope, weakness, light-headedness, numbness, headaches and memory loss.   Hematological:  Negative for adenopathy. Does not bruise/bleed easily.   Psychiatric/Behavioral:  Negative for agitation, confusion, hallucinations, sleep disturbance and suicidal ideas. The patient is not nervous/anxious.               Vitals:    03/20/25 0932   BP: (!) 153/77   BP Location: Left arm   Patient Position: Sitting   Pulse: (!) 53   Resp: 18   Temp: 97.6 °F (36.4 °C)   TempSrc: Oral   SpO2: 95%   Weight: 84 kg (185 lb 3.2 oz)   Height: 6' 1" (1.854 m)              Wt Readings from Last 6 Encounters:   03/20/25 84 kg (185 lb 3.2 oz)   03/17/25 86.2 kg (190 lb)   10/10/24 81 kg (178 lb 9.6 oz)   09/12/24 82.2 kg (181 lb 3.2 oz)   09/04/24 80.3 kg (177 lb)   04/05/24 79.8 kg (176 lb)     Body mass index is 24.43 kg/m².  Body surface area is 2.08 meters squared.  Physical Exam  Vitals and nursing note reviewed.   Constitutional:       General: He is not in acute distress.     Appearance: Normal appearance.   HENT:      Head: Normocephalic and atraumatic.      Mouth/Throat:      Mouth: Mucous membranes are moist.   Eyes:      General: No scleral icterus.     Extraocular Movements: Extraocular movements intact.      Conjunctiva/sclera: Conjunctivae normal.   Neck:      Vascular: No JVD.   Cardiovascular:      Rate and Rhythm: Normal rate and regular rhythm.      Heart sounds: No murmur heard.  Pulmonary:      Effort: Pulmonary effort is normal.      Breath sounds: Normal breath sounds. No wheezing or rhonchi.   Abdominal:      General: Bowel sounds are normal. There is no distension.      Palpations: Abdomen is soft. "      Tenderness: There is no abdominal tenderness.   Musculoskeletal:         General: No swelling or deformity.      Cervical back: Neck supple.   Lymphadenopathy:      Head:      Right side of head: No submental or submandibular adenopathy.      Left side of head: Submental adenopathy present. No submandibular adenopathy.      Cervical: No cervical adenopathy.      Upper Body:      Right upper body: No supraclavicular or axillary adenopathy.      Left upper body: No supraclavicular or axillary adenopathy.      Lower Body: No right inguinal adenopathy. No left inguinal adenopathy.   Skin:     General: Skin is warm.      Coloration: Skin is not jaundiced.      Findings: No rash.   Neurological:      General: No focal deficit present.      Mental Status: He is alert and oriented to person, place, and time.      Cranial Nerves: Cranial nerves 2-12 are intact.   Psychiatric:         Attention and Perception: Attention normal.         Behavior: Behavior is cooperative.       Laboratory:  CBC with Differential:  Lab Results   Component Value Date    WBC 5.35 03/20/2025    RBC 5.18 03/20/2025    HGB 15.6 03/20/2025    HCT 45.1 03/20/2025    MCV 87.1 03/20/2025    MCH 30.1 03/20/2025    MCHC 34.6 03/20/2025    RDW 12.8 03/20/2025     03/20/2025    MPV 8.7 03/20/2025      CMP:  Sodium   Date Value Ref Range Status   03/20/2025 136 136 - 145 mmol/L Final   11/26/2022 137 136 - 145 mmol/L Final     Potassium   Date Value Ref Range Status   03/20/2025 4.6 3.5 - 5.1 mmol/L Final   11/26/2022 4.1 3.5 - 5.1 mmol/L Final     Chloride   Date Value Ref Range Status   03/20/2025 102 98 - 107 mmol/L Final   11/26/2022 103 100 - 109 mmol/L Final     CO2   Date Value Ref Range Status   03/20/2025 25 23 - 31 mmol/L Final     Carbon Dioxide   Date Value Ref Range Status   11/26/2022 26 22 - 33 mmol/L Final     Blood Urea Nitrogen   Date Value Ref Range Status   03/20/2025 27.5 (H) 8.4 - 25.7 mg/dL Final   11/26/2022 20 5 - 25 mg/dL  Final     Creatinine   Date Value Ref Range Status   03/20/2025 1.48 (H) 0.72 - 1.25 mg/dL Final   11/26/2022 1.27 (H) 0.57 - 1.25 mg/dL Final     Calcium   Date Value Ref Range Status   03/20/2025 10.5 (H) 8.8 - 10.0 mg/dL Final   11/26/2022 9.6 8.8 - 10.6 mg/dL Final     Albumin   Date Value Ref Range Status   03/20/2025 4.3 3.4 - 4.8 g/dL Final     Bilirubin Total   Date Value Ref Range Status   03/20/2025 0.7 <=1.5 mg/dL Final     ALP   Date Value Ref Range Status   03/20/2025 88 40 - 150 unit/L Final     AST   Date Value Ref Range Status   03/20/2025 21 5 - 34 unit/L Final     ALT   Date Value Ref Range Status   03/20/2025 20 0 - 55 unit/L Final     Anion Gap   Date Value Ref Range Status   11/26/2022 8 8 - 16 mmol/L Final     eGFR    Date Value Ref Range Status   11/26/2022 62 mL/min/1.73mSq Final     Comment:     In accordance with NKF-ASN Task Force recommendation, calculation based on the Chronic Kidney Disease Epidemiology Collaboration (CKD-EPI) equation without adjustment for race. eGFR adjusted for gender and age and calculated in ml/min/1.73mSquared. eGFR cannot be calculated if patient is under 18 years of age.     Reference Range:   >= 60 ml/min/1.73mSquared.     Estimated GFR-Non    Date Value Ref Range Status   09/16/2021 >60 mL/min/1.73 m2 Final         Assessment:       1. Clear cell renal cell carcinoma, left    2. S/p nephrectomy    3. Lung nodules    4. Lymphadenopathy        1) pT1a G2-3 Left RCC  ---s/p robot assisted laparoscopic partial nephrectomy on 11/25/2022   2) Mesenteric lymphadenopathy-improving  3) Splenomegaly-same    During last visit and again today we discussed:  Etiology of LAD is uncertain with differential consideration including both low-grade lymphoproliferative disease  (ie low grade lymphoma) vs metastatic renal disease.  Patient with small pulmonary nodules in the lung bases, will do Ct chest for further evaluation, lung metastasis is  not excluded.    Relatively stable LAD and now splenomegaly highly suspicious for low grade lymphoma though.  Explain that low-grade lymphoma can be followed closely and then treat if there is any indication for treatment at the time that include threatened end-organ function, cytopenias, bulky disease, steady or rapid progression or patient with symptoms.  Observation include labs and imaging studies.    Recent CT Chest on 11/7/23 with findings strongly suggestive of pulmonary sarcoidosis  CT AP with improvement in LAD but persistent mild splenomegaly. Will cont to monitor      Plan:     Continue follow ups with Dr. Cha and Dr. Hobson  CT Chest deferred to Dr Cha  CT A/P w and w/o contrast - due 9/2025 @ [facility] in 6 months with MD, same day labs: CBC, CMP    The patient was seen, interviewed and examined. Pertinent lab and radiology studies were reviewed.   The patient was given ample opportunity to ask questions, and to the best of my abilities, all questions answered to satisfaction; patient demonstrated understanding of what we discussed and agreeable to the plan. Pt instructed to call should develop concerning signs/symptoms or have further questions.   Visit today included increased complexity associated with the care of the episodic problem RCC, LAD, addressing and managing the longitudinal care of the patient's RCC, LAD.       Kelsie Jean-Baptiste MD  Hematology/Oncology        Professional Services   I, Daly Bustos LPN, acted solely as a scribe for and in the presence of Dr. Kelsie Jean-Baptiste, who performed these services.

## 2025-03-20 ENCOUNTER — OFFICE VISIT (OUTPATIENT)
Dept: HEMATOLOGY/ONCOLOGY | Facility: CLINIC | Age: 70
End: 2025-03-20
Payer: MEDICARE

## 2025-03-20 ENCOUNTER — LAB VISIT (OUTPATIENT)
Dept: LAB | Facility: HOSPITAL | Age: 70
End: 2025-03-20
Attending: INTERNAL MEDICINE
Payer: MEDICARE

## 2025-03-20 VITALS
HEIGHT: 73 IN | TEMPERATURE: 98 F | OXYGEN SATURATION: 95 % | DIASTOLIC BLOOD PRESSURE: 77 MMHG | BODY MASS INDEX: 24.54 KG/M2 | HEART RATE: 53 BPM | SYSTOLIC BLOOD PRESSURE: 153 MMHG | WEIGHT: 185.19 LBS | RESPIRATION RATE: 18 BRPM

## 2025-03-20 DIAGNOSIS — R59.1 LYMPHADENOPATHY: ICD-10-CM

## 2025-03-20 DIAGNOSIS — Z90.5 S/P NEPHRECTOMY: ICD-10-CM

## 2025-03-20 DIAGNOSIS — C64.2 CLEAR CELL RENAL CELL CARCINOMA, LEFT: Primary | ICD-10-CM

## 2025-03-20 DIAGNOSIS — R91.8 LUNG NODULES: ICD-10-CM

## 2025-03-20 DIAGNOSIS — C64.2 CLEAR CELL RENAL CELL CARCINOMA, LEFT: ICD-10-CM

## 2025-03-20 LAB
ALBUMIN SERPL-MCNC: 4.3 G/DL (ref 3.4–4.8)
ALBUMIN/GLOB SERPL: 1.1 RATIO (ref 1.1–2)
ALP SERPL-CCNC: 88 UNIT/L (ref 40–150)
ALT SERPL-CCNC: 20 UNIT/L (ref 0–55)
ANION GAP SERPL CALC-SCNC: 9 MEQ/L
AST SERPL-CCNC: 21 UNIT/L (ref 5–34)
BASOPHILS # BLD AUTO: 0.06 X10(3)/MCL
BASOPHILS NFR BLD AUTO: 1.1 %
BILIRUB SERPL-MCNC: 0.7 MG/DL
BUN SERPL-MCNC: 27.5 MG/DL (ref 8.4–25.7)
CALCIUM SERPL-MCNC: 10.5 MG/DL (ref 8.8–10)
CHLORIDE SERPL-SCNC: 102 MMOL/L (ref 98–107)
CO2 SERPL-SCNC: 25 MMOL/L (ref 23–31)
CREAT SERPL-MCNC: 1.48 MG/DL (ref 0.72–1.25)
CREAT/UREA NIT SERPL: 19
EOSINOPHIL # BLD AUTO: 0.18 X10(3)/MCL (ref 0–0.9)
EOSINOPHIL NFR BLD AUTO: 3.4 %
ERYTHROCYTE [DISTWIDTH] IN BLOOD BY AUTOMATED COUNT: 12.8 % (ref 11.5–17)
GFR SERPLBLD CREATININE-BSD FMLA CKD-EPI: 51 ML/MIN/1.73/M2
GLOBULIN SER-MCNC: 4 GM/DL (ref 2.4–3.5)
GLUCOSE SERPL-MCNC: 162 MG/DL (ref 82–115)
HCT VFR BLD AUTO: 45.1 % (ref 42–52)
HGB BLD-MCNC: 15.6 G/DL (ref 14–18)
IMM GRANULOCYTES # BLD AUTO: 0.02 X10(3)/MCL (ref 0–0.04)
IMM GRANULOCYTES NFR BLD AUTO: 0.4 %
LYMPHOCYTES # BLD AUTO: 1.11 X10(3)/MCL (ref 0.6–4.6)
LYMPHOCYTES NFR BLD AUTO: 20.7 %
MCH RBC QN AUTO: 30.1 PG (ref 27–31)
MCHC RBC AUTO-ENTMCNC: 34.6 G/DL (ref 33–36)
MCV RBC AUTO: 87.1 FL (ref 80–94)
MONOCYTES # BLD AUTO: 0.51 X10(3)/MCL (ref 0.1–1.3)
MONOCYTES NFR BLD AUTO: 9.5 %
NEUTROPHILS # BLD AUTO: 3.47 X10(3)/MCL (ref 2.1–9.2)
NEUTROPHILS NFR BLD AUTO: 64.9 %
PLATELET # BLD AUTO: 217 X10(3)/MCL (ref 130–400)
PMV BLD AUTO: 8.7 FL (ref 7.4–10.4)
POTASSIUM SERPL-SCNC: 4.6 MMOL/L (ref 3.5–5.1)
PROT SERPL-MCNC: 8.3 GM/DL (ref 5.8–7.6)
RBC # BLD AUTO: 5.18 X10(6)/MCL (ref 4.7–6.1)
SODIUM SERPL-SCNC: 136 MMOL/L (ref 136–145)
WBC # BLD AUTO: 5.35 X10(3)/MCL (ref 4.5–11.5)

## 2025-03-20 PROCEDURE — 1101F PT FALLS ASSESS-DOCD LE1/YR: CPT | Mod: CPTII,S$GLB,, | Performed by: INTERNAL MEDICINE

## 2025-03-20 PROCEDURE — 99999 PR PBB SHADOW E&M-EST. PATIENT-LVL IV: CPT | Mod: PBBFAC,,, | Performed by: INTERNAL MEDICINE

## 2025-03-20 PROCEDURE — 85025 COMPLETE CBC W/AUTO DIFF WBC: CPT

## 2025-03-20 PROCEDURE — 99214 OFFICE O/P EST MOD 30 MIN: CPT | Mod: S$GLB,,, | Performed by: INTERNAL MEDICINE

## 2025-03-20 PROCEDURE — 3078F DIAST BP <80 MM HG: CPT | Mod: CPTII,S$GLB,, | Performed by: INTERNAL MEDICINE

## 2025-03-20 PROCEDURE — 80053 COMPREHEN METABOLIC PANEL: CPT

## 2025-03-20 PROCEDURE — 1126F AMNT PAIN NOTED NONE PRSNT: CPT | Mod: CPTII,S$GLB,, | Performed by: INTERNAL MEDICINE

## 2025-03-20 PROCEDURE — G2211 COMPLEX E/M VISIT ADD ON: HCPCS | Mod: S$GLB,,, | Performed by: INTERNAL MEDICINE

## 2025-03-20 PROCEDURE — 3008F BODY MASS INDEX DOCD: CPT | Mod: CPTII,S$GLB,, | Performed by: INTERNAL MEDICINE

## 2025-03-20 PROCEDURE — 1159F MED LIST DOCD IN RCRD: CPT | Mod: CPTII,S$GLB,, | Performed by: INTERNAL MEDICINE

## 2025-03-20 PROCEDURE — 3077F SYST BP >= 140 MM HG: CPT | Mod: CPTII,S$GLB,, | Performed by: INTERNAL MEDICINE

## 2025-03-20 PROCEDURE — 3288F FALL RISK ASSESSMENT DOCD: CPT | Mod: CPTII,S$GLB,, | Performed by: INTERNAL MEDICINE

## 2025-03-20 PROCEDURE — 36415 COLL VENOUS BLD VENIPUNCTURE: CPT

## 2025-04-10 PROBLEM — E11.22 CKD STAGE 3 DUE TO TYPE 2 DIABETES MELLITUS: Status: ACTIVE | Noted: 2025-04-10

## 2025-04-10 PROBLEM — N18.30 CKD STAGE 3 DUE TO TYPE 2 DIABETES MELLITUS: Status: ACTIVE | Noted: 2025-04-10

## 2025-07-17 DIAGNOSIS — C64.2 MALIGNANT NEOPLASM OF LEFT KIDNEY, EXCEPT RENAL PELVIS: Primary | ICD-10-CM

## (undated) DEVICE — BOWL STERILE LARGE 32OZ

## (undated) DEVICE — TUBING SUC MEDI-VAC CONN 6FT

## (undated) DEVICE — PROTECTOR CORNEAL CROUCH ST

## (undated) DEVICE — NEBULIZER MISY FAST 02 TUB 7FT

## (undated) DEVICE — ATOMIZER NASAL DRUG DEL NO SYR

## (undated) DEVICE — KIT SURGIFLO HEMOSTATIC MATRIX

## (undated) DEVICE — BLADE SURG STAINLESS STEEL #11

## (undated) DEVICE — BRUSH CLEANING 1-1.5X950MM

## (undated) DEVICE — TIP SUCTION YANKAUER

## (undated) DEVICE — DRESSING SINUS STENT.

## (undated) DEVICE — TUBING AIRLIFE O2 VINYL 7FT

## (undated) DEVICE — Device

## (undated) DEVICE — DRESSING NASOPORE FD 8CM

## (undated) DEVICE — GLOVE PROTEXIS NEOPRN SZ8

## (undated) DEVICE — VALVE OLYMPUS SCOPE BIOPSY

## (undated) DEVICE — LMA I-GEL  4.0 ADULT MD 50-90

## (undated) DEVICE — KIT CLN RIVITAL-OX ENDOSCP

## (undated) DEVICE — KIT CANIST SUCTION 1200CC

## (undated) DEVICE — SYR 3ML LL 18GA 1.5IN

## (undated) DEVICE — SYR DISP LL 5CC

## (undated) DEVICE — SYR ONLY LEUR TIP 30CC

## (undated) DEVICE — CLIP LIGATING HEMOCLP SMALL

## (undated) DEVICE — SET TUBE (1) SMARTSITE PRT 104

## (undated) DEVICE — ELECTRODE RED DOT EKG

## (undated) DEVICE — SUT PLN GUT 4-0 SC-1SC-1 1

## (undated) DEVICE — GLOVE PROTEXIS BLUE LATEX 7

## (undated) DEVICE — SOL NACL IRR 1000ML BTL

## (undated) DEVICE — SUT 2/0 30IN SILK BLK BRAI

## (undated) DEVICE — GAUZE SPONGE 4X4 12PLY

## (undated) DEVICE — SOL NORMAL USPCA 0.9%

## (undated) DEVICE — STOPCOCK DISCOFIX 3 WAY

## (undated) DEVICE — WRAP STERILIZATION 45X45 DISP

## (undated) DEVICE — CONNECTOR TUBING OXYGEN

## (undated) DEVICE — ADAPTER NUT NIPPLE

## (undated) DEVICE — CATH BRONCHOSCOPE F/BF

## (undated) DEVICE — NDL 27G X 1 1/4

## (undated) DEVICE — SEE MEDLINE ITEM 157059

## (undated) DEVICE — SUT 5/0 18IN PROLENE BL MO

## (undated) DEVICE — PACKING NASOPORE NASAL STD 8CM

## (undated) DEVICE — TRAP MUCOUS SPECIMEN 80CCBY BU

## (undated) DEVICE — GOWN ISOLATION OTH SMS YLW LG

## (undated) DEVICE — TUBE SUCTION MEDI-VAC STERILE

## (undated) DEVICE — SOLIDIFIER BOTTLE 1500CC

## (undated) DEVICE — NDL SPINAL 22GA 3.5 IN QUINCKE

## (undated) DEVICE — OXISENSOR ADULT DIGIT N/S

## (undated) DEVICE — GLOVE PROTEXIS LTX MICRO 6.5

## (undated) DEVICE — ADAPTER SWIVEL

## (undated) DEVICE — SOL IRR SOD CHL .9% POUR

## (undated) DEVICE — GLOVE PROTEXIS LTX MICRO  7

## (undated) DEVICE — JELLY LUBE FOIL PK STER 2.7GR

## (undated) DEVICE — VALVE OLYMPUS SCOPE SUCTION

## (undated) DEVICE — BRUSH CLEANING EBUS SMALL

## (undated) DEVICE — SYR ONLY LUER LOCK 20CC

## (undated) DEVICE — SUPPORT ULNA NERVE PROTECTOR

## (undated) DEVICE — CANNULA NASAL NONFLARE TIP 7FT

## (undated) DEVICE — SUT PLN 4-0 P3 18IN GUT YEL

## (undated) DEVICE — APPLICATOR STRL COT 2INNR 6IN

## (undated) DEVICE — SYR SLIP TIP 10ML SHIELD

## (undated) DEVICE — SET INTUB 2 STR SS PROBES